# Patient Record
Sex: MALE | Race: WHITE | NOT HISPANIC OR LATINO
[De-identification: names, ages, dates, MRNs, and addresses within clinical notes are randomized per-mention and may not be internally consistent; named-entity substitution may affect disease eponyms.]

---

## 2020-12-18 PROBLEM — Z00.00 ENCOUNTER FOR PREVENTIVE HEALTH EXAMINATION: Status: ACTIVE | Noted: 2020-12-18

## 2020-12-22 ENCOUNTER — APPOINTMENT (OUTPATIENT)
Dept: SURGERY | Facility: CLINIC | Age: 50
End: 2020-12-22
Payer: MEDICAID

## 2020-12-22 VITALS
HEIGHT: 71 IN | BODY MASS INDEX: 23.8 KG/M2 | WEIGHT: 170 LBS | SYSTOLIC BLOOD PRESSURE: 148 MMHG | DIASTOLIC BLOOD PRESSURE: 80 MMHG

## 2020-12-22 DIAGNOSIS — Z87.891 PERSONAL HISTORY OF NICOTINE DEPENDENCE: ICD-10-CM

## 2020-12-22 DIAGNOSIS — Z87.442 PERSONAL HISTORY OF URINARY CALCULI: ICD-10-CM

## 2020-12-22 PROCEDURE — 99072 ADDL SUPL MATRL&STAF TM PHE: CPT

## 2020-12-22 PROCEDURE — 99203 OFFICE O/P NEW LOW 30 MIN: CPT

## 2020-12-27 PROBLEM — Z87.891 FORMER SMOKER: Status: ACTIVE | Noted: 2020-12-27

## 2020-12-27 PROBLEM — Z87.442 HISTORY OF KIDNEY STONES: Status: RESOLVED | Noted: 2020-12-27 | Resolved: 2020-12-27

## 2020-12-27 NOTE — REASON FOR VISIT
[Initial Consultation] : an initial consultation for [FreeTextEntry2] : primary hyperparathyroidism [Spouse] : spouse

## 2020-12-27 NOTE — PHYSICAL EXAM
[de-identified] : no cervical or supraclavicular adenopathy, trachea midline, thyroid without enlargement or palpable mass [Normal] : no neck adenopathy [de-identified] : Skin:  normal appearance.  no rash, nodules, vesicles, or erythema,\par Musculoskeletal:  full range of motion and no deformities appreciated\par Neurological:  grossly intact\par Psychiatric:  oriented to person, place and time with appropriate affect

## 2020-12-27 NOTE — HISTORY OF PRESENT ILLNESS
[de-identified] : Patient referred by Dr. Trujillo for evaluation of primary hyperparathyroidism. Patient was recently noted to have elevated calcium. Reports history of kidney stones, shoulder, and muscle pain, and memory difficulty. Patient denies hypertension, reflux, increased thirst, dysphagia, change in voice or radiation exposure.  Calcium 11.6, creatinine 1.3, , 24-hour urine calcium 91.

## 2020-12-27 NOTE — CONSULT LETTER
[Dear  ___] : Dear  [unfilled], [Consult Letter:] : I had the pleasure of evaluating your patient, [unfilled]. [Please see my note below.] : Please see my note below. [Consult Closing:] : Thank you very much for allowing me to participate in the care of this patient.  If you have any questions, please do not hesitate to contact me. [FreeTextEntry2] : Dr. Chelsie Trujillo [FreeTextEntry3] : Sincerely yours,\par \par Vicki Quinones MD, FACS\par Assistant Professor of Surgery\par [facility]

## 2020-12-27 NOTE — ASSESSMENT
[FreeTextEntry1] : Patient with primary hyperparathyroidism, and history of kidney stones. I recommended a parathyroidectomy with intraoperative PTH monitoring. I have requested a 4D CT scan to assist in preoperative localization.  I have reviewed the pathophysiology of the disease process, the area anatomy and the rationale for surgery.  I have discussed the risks, benefits and alternative treatments which include but are not limited to bleeding, infection, numbness, hoarseness, hypocalcemia, scarring, and need for reoperation. I have answered the patient's questions to their satisfaction. The patient wishes to proceed with recommended procedure.They will contact my office to schedule surgery.

## 2021-01-15 ENCOUNTER — OUTPATIENT (OUTPATIENT)
Dept: OUTPATIENT SERVICES | Facility: HOSPITAL | Age: 51
LOS: 1 days | End: 2021-01-15

## 2021-01-15 VITALS
WEIGHT: 164.02 LBS | HEART RATE: 54 BPM | DIASTOLIC BLOOD PRESSURE: 84 MMHG | SYSTOLIC BLOOD PRESSURE: 122 MMHG | RESPIRATION RATE: 14 BRPM | OXYGEN SATURATION: 98 % | TEMPERATURE: 98 F | HEIGHT: 70.5 IN

## 2021-01-15 DIAGNOSIS — E21.0 PRIMARY HYPERPARATHYROIDISM: ICD-10-CM

## 2021-01-15 DIAGNOSIS — Z98.890 OTHER SPECIFIED POSTPROCEDURAL STATES: Chronic | ICD-10-CM

## 2021-01-15 LAB
ANION GAP SERPL CALC-SCNC: 7 MMOL/L — SIGNIFICANT CHANGE UP (ref 7–14)
BUN SERPL-MCNC: 18 MG/DL — SIGNIFICANT CHANGE UP (ref 7–23)
CALCIUM SERPL-MCNC: 11.9 MG/DL — HIGH (ref 8.4–10.5)
CHLORIDE SERPL-SCNC: 108 MMOL/L — HIGH (ref 98–107)
CO2 SERPL-SCNC: 28 MMOL/L — SIGNIFICANT CHANGE UP (ref 22–31)
CREAT SERPL-MCNC: 1.46 MG/DL — HIGH (ref 0.5–1.3)
GLUCOSE SERPL-MCNC: 114 MG/DL — HIGH (ref 70–99)
HCT VFR BLD CALC: 43.5 % — SIGNIFICANT CHANGE UP (ref 39–50)
HGB BLD-MCNC: 13.5 G/DL — SIGNIFICANT CHANGE UP (ref 13–17)
MCHC RBC-ENTMCNC: 29.5 PG — SIGNIFICANT CHANGE UP (ref 27–34)
MCHC RBC-ENTMCNC: 31 GM/DL — LOW (ref 32–36)
MCV RBC AUTO: 95 FL — SIGNIFICANT CHANGE UP (ref 80–100)
NRBC # BLD: 0 /100 WBCS — SIGNIFICANT CHANGE UP
NRBC # FLD: 0 K/UL — SIGNIFICANT CHANGE UP
PLATELET # BLD AUTO: 195 K/UL — SIGNIFICANT CHANGE UP (ref 150–400)
POTASSIUM SERPL-MCNC: 4.2 MMOL/L — SIGNIFICANT CHANGE UP (ref 3.5–5.3)
POTASSIUM SERPL-SCNC: 4.2 MMOL/L — SIGNIFICANT CHANGE UP (ref 3.5–5.3)
RBC # BLD: 4.58 M/UL — SIGNIFICANT CHANGE UP (ref 4.2–5.8)
RBC # FLD: 12 % — SIGNIFICANT CHANGE UP (ref 10.3–14.5)
SODIUM SERPL-SCNC: 143 MMOL/L — SIGNIFICANT CHANGE UP (ref 135–145)
WBC # BLD: 7.21 K/UL — SIGNIFICANT CHANGE UP (ref 3.8–10.5)
WBC # FLD AUTO: 7.21 K/UL — SIGNIFICANT CHANGE UP (ref 3.8–10.5)

## 2021-01-15 NOTE — H&P PST ADULT - HISTORY OF PRESENT ILLNESS
50 year old agitated male presents as poor historian, refuses to discuss PMH and scheduled procedure and current symptoms, now scheduled for parathyroidectomy with parathyroid hormone assay , denies any medical history, pt very agitated stated he had surgeries in the past then retracts statements saying he doesn't like doctors   Provided pt opportunity to explain and feel comfortable  50 year old agitated male presents as poor historian, refuses to discuss PMH and scheduled procedure or current symptoms, now scheduled for parathyroidectomy with parathyroid hormone assay , denies any medical history, pt very agitated stated he had surgeries in the past then retracts statements saying he doesn't like doctors   Provided pt opportunity to explain feelings and feel comfortable
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
## 2021-01-15 NOTE — H&P PST ADULT - VENOUS THROMBOEMBOLISM CURRENT STATUS
(1) other risk factor (includes escalating BMI, pack-years of smoking, diabetes requiring insulin, chemotherapy, female gender and length of surgery) (0) indicator not present

## 2021-01-15 NOTE — H&P PST ADULT - NSICDXPASTMEDICALHX_GEN_ALL_CORE_FT
PAST MEDICAL HISTORY:  History of memory loss after CVA    Kidney stone "15 years ago"    Primary hyperparathyroidism

## 2021-01-15 NOTE — H&P PST ADULT - NSICDXPASTSURGICALHX_GEN_ALL_CORE_FT
PAST SURGICAL HISTORY:  H/O cranial reconstruction 2018, cranial bone extraction     PAST SURGICAL HISTORY:  H/O craniotomy 2018

## 2021-01-15 NOTE — H&P PST ADULT - NEGATIVE NEUROLOGICAL SYMPTOMS
no weakness/no paresthesias/no generalized seizures/no focal seizures/no loss of sensation/no difficulty walking/no headache/no loss of consciousness/no hemiparesis

## 2021-01-15 NOTE — H&P PST ADULT - ASSESSMENT
Problem: primary hyperparathyroidism  Assessment and Plan: Patient scheduled for surgery on 1/22/2021  Patient provided with verbal and written presurgical instructions; verbalized understanding  with teach back.    Patient provided with famotidine for GI prophylaxis; verbalized understanding.    Patient provided with Chlorhexidine wash, verbal and written instructions reviewed. Patient demonstrated understanding with teach back.   Patient confirmed COVID appointment     Case discussed with Dr. Milner, nor further workup required

## 2021-01-15 NOTE — H&P PST ADULT - NEGATIVE MUSCULOSKELETAL SYMPTOMS
no arthritis/no joint swelling/no myalgia/no muscle cramps/no neck pain/no back pain no arthritis/no joint swelling/no myalgia/no muscle cramps/no muscle weakness/no stiffness/no neck pain/no back pain

## 2021-01-15 NOTE — H&P PST ADULT - MUSCULOSKELETAL
details… No joint pain, swelling or deformity; no limitation of movement ROM intact/no joint swelling/no joint erythema detailed exam

## 2021-01-17 ENCOUNTER — OUTPATIENT (OUTPATIENT)
Dept: OUTPATIENT SERVICES | Facility: HOSPITAL | Age: 51
LOS: 1 days | End: 2021-01-17
Payer: MEDICAID

## 2021-01-17 ENCOUNTER — APPOINTMENT (OUTPATIENT)
Dept: CT IMAGING | Facility: IMAGING CENTER | Age: 51
End: 2021-01-17
Payer: MEDICAID

## 2021-01-17 ENCOUNTER — RESULT REVIEW (OUTPATIENT)
Age: 51
End: 2021-01-17

## 2021-01-17 DIAGNOSIS — E21.0 PRIMARY HYPERPARATHYROIDISM: ICD-10-CM

## 2021-01-17 DIAGNOSIS — Z98.890 OTHER SPECIFIED POSTPROCEDURAL STATES: Chronic | ICD-10-CM

## 2021-01-17 PROBLEM — Z87.898 PERSONAL HISTORY OF OTHER SPECIFIED CONDITIONS: Chronic | Status: ACTIVE | Noted: 2021-01-15

## 2021-01-17 PROBLEM — N20.0 CALCULUS OF KIDNEY: Chronic | Status: ACTIVE | Noted: 2021-01-15

## 2021-01-17 PROCEDURE — 70492 CT SFT TSUE NCK W/O & W/DYE: CPT

## 2021-01-17 PROCEDURE — 70491 CT SOFT TISSUE NECK W/DYE: CPT | Mod: 26

## 2021-01-19 ENCOUNTER — LABORATORY RESULT (OUTPATIENT)
Age: 51
End: 2021-01-19

## 2021-01-19 ENCOUNTER — APPOINTMENT (OUTPATIENT)
Dept: DISASTER EMERGENCY | Facility: CLINIC | Age: 51
End: 2021-01-19

## 2021-01-22 ENCOUNTER — OUTPATIENT (OUTPATIENT)
Dept: OUTPATIENT SERVICES | Facility: HOSPITAL | Age: 51
LOS: 1 days | Discharge: HOME | End: 2021-01-22
Payer: MEDICAID

## 2021-01-22 ENCOUNTER — APPOINTMENT (OUTPATIENT)
Dept: SURGERY | Facility: HOSPITAL | Age: 51
End: 2021-01-22

## 2021-01-22 ENCOUNTER — RESULT REVIEW (OUTPATIENT)
Age: 51
End: 2021-01-22

## 2021-01-22 ENCOUNTER — NON-APPOINTMENT (OUTPATIENT)
Age: 51
End: 2021-01-22

## 2021-01-22 VITALS
DIASTOLIC BLOOD PRESSURE: 83 MMHG | RESPIRATION RATE: 16 BRPM | OXYGEN SATURATION: 97 % | TEMPERATURE: 98 F | HEART RATE: 59 BPM | HEIGHT: 70 IN | WEIGHT: 164.02 LBS | SYSTOLIC BLOOD PRESSURE: 141 MMHG

## 2021-01-22 VITALS
RESPIRATION RATE: 15 BRPM | OXYGEN SATURATION: 100 % | HEART RATE: 68 BPM | DIASTOLIC BLOOD PRESSURE: 89 MMHG | SYSTOLIC BLOOD PRESSURE: 133 MMHG

## 2021-01-22 DIAGNOSIS — E21.0 PRIMARY HYPERPARATHYROIDISM: ICD-10-CM

## 2021-01-22 DIAGNOSIS — Z98.890 OTHER SPECIFIED POSTPROCEDURAL STATES: Chronic | ICD-10-CM

## 2021-01-22 LAB
PTH INTACT, INTRAOP SPECIMEN 2: SIGNIFICANT CHANGE UP
PTH INTACT, INTRAOP SPECIMEN 3: SIGNIFICANT CHANGE UP
PTH INTACT, INTRAOP SPECIMEN 4: SIGNIFICANT CHANGE UP
PTH INTACT, INTRAOP SPECIMEN 5: SIGNIFICANT CHANGE UP
PTH INTACT, INTRAOP TIMING 2: SIGNIFICANT CHANGE UP
PTH INTACT, INTRAOP TIMING 3: SIGNIFICANT CHANGE UP
PTH INTACT, INTRAOP TIMING 4: SIGNIFICANT CHANGE UP
PTH INTACT, INTRAOP TIMING 5: SIGNIFICANT CHANGE UP
PTH INTACT, INTRAOPERATIVE 2: 339 PG/ML — HIGH (ref 15–65)
PTH INTACT, INTRAOPERATIVE 3: 138 PG/ML — HIGH (ref 15–65)
PTH INTACT, INTRAOPERATIVE 4: 86 PG/ML — HIGH (ref 15–65)
PTH INTACT, INTRAOPERATIVE 5: 64 PG/ML — SIGNIFICANT CHANGE UP (ref 15–65)
PTH-INTACT IO % DIF SERPL: 243 PG/ML — HIGH (ref 15–65)

## 2021-01-22 PROCEDURE — 60500 EXPLORE PARATHYROID GLANDS: CPT

## 2021-01-22 PROCEDURE — 88334 PATH CONSLTJ SURG CYTO XM EA: CPT | Mod: 26,59

## 2021-01-22 PROCEDURE — 88331 PATH CONSLTJ SURG 1 BLK 1SPC: CPT | Mod: 26

## 2021-01-22 PROCEDURE — 88305 TISSUE EXAM BY PATHOLOGIST: CPT | Mod: 26

## 2021-01-22 RX ORDER — BENZOCAINE AND MENTHOL 5; 1 G/100ML; G/100ML
1 LIQUID ORAL
Refills: 0 | Status: DISCONTINUED | OUTPATIENT
Start: 2021-01-22 | End: 2021-02-05

## 2021-01-22 RX ORDER — ACETAMINOPHEN 500 MG
2 TABLET ORAL
Qty: 0 | Refills: 0 | DISCHARGE
Start: 2021-01-22

## 2021-01-22 RX ORDER — METOCLOPRAMIDE HCL 10 MG
10 TABLET ORAL ONCE
Refills: 0 | Status: DISCONTINUED | OUTPATIENT
Start: 2021-01-22 | End: 2021-02-05

## 2021-01-22 RX ORDER — ONDANSETRON 8 MG/1
4 TABLET, FILM COATED ORAL ONCE
Refills: 0 | Status: DISCONTINUED | OUTPATIENT
Start: 2021-01-22 | End: 2021-02-05

## 2021-01-22 RX ORDER — BENZOCAINE AND MENTHOL 5; 1 G/100ML; G/100ML
1 LIQUID ORAL
Qty: 0 | Refills: 0 | DISCHARGE
Start: 2021-01-22

## 2021-01-22 RX ORDER — FENTANYL CITRATE 50 UG/ML
25 INJECTION INTRAVENOUS
Refills: 0 | Status: DISCONTINUED | OUTPATIENT
Start: 2021-01-22 | End: 2021-01-22

## 2021-01-22 RX ORDER — ACETAMINOPHEN 500 MG
650 TABLET ORAL EVERY 6 HOURS
Refills: 0 | Status: DISCONTINUED | OUTPATIENT
Start: 2021-01-22 | End: 2021-02-05

## 2021-01-22 RX ADMIN — Medication 2 TABLET(S): at 09:28

## 2021-01-22 NOTE — BRIEF OPERATIVE NOTE - OPERATION/FINDINGS
Removal of enlarged left superior parathyroid gland. Intraoperative PTH decreased appropriately at 3-5 minute half-life time points.

## 2021-01-22 NOTE — ASU DISCHARGE PLAN (ADULT/PEDIATRIC) - ASU DC SPECIAL INSTRUCTIONSFT
Keep dressings dry for 48 hours, then may shower with steri strips.   TAKE OTC CALCIUM (500mg or 600mg) + Vit D as directed, 2 tabs every 8 hours (3 times a day).  Avoid NSAIDs (Advil, Motrin, Ibuprofen) for 48 hours.   Take OTC Tylenol 325mg, 2 tabs every 6 hours as needed for pain.   May take OTC Lozenges (Cepacol) 1 tab every 4 hours for sore throat as needed.  Ice pack to neck for comfort as needed.   *Follow up with Dr. Quinones within 1 week (1/28/2021), please call the office for an appointment.

## 2021-01-22 NOTE — ASU DISCHARGE PLAN (ADULT/PEDIATRIC) - CARE PROVIDER_API CALL
Vicki Quinones (MD)  Surgery  [address], [address]  [place] [address]  Phone: [phone]  Fax: [phone]  Scheduled Appointment: 01/28/2021

## 2021-01-26 LAB — SURGICAL PATHOLOGY STUDY: SIGNIFICANT CHANGE UP

## 2021-01-28 ENCOUNTER — APPOINTMENT (OUTPATIENT)
Dept: SURGERY | Facility: CLINIC | Age: 51
End: 2021-01-28
Payer: MEDICAID

## 2021-01-28 PROCEDURE — 99024 POSTOP FOLLOW-UP VISIT: CPT

## 2021-01-28 PROCEDURE — 36415 COLL VENOUS BLD VENIPUNCTURE: CPT

## 2021-01-28 NOTE — HISTORY OF PRESENT ILLNESS
[de-identified] : Patient referred by Dr. Trujillo for evaluation of primary hyperparathyroidism. Patient was recently noted to have elevated calcium. Reports history of kidney stones, shoulder, and muscle pain, and memory difficulty. Patient denies hypertension, reflux, increased thirst, dysphagia, change in voice or radiation exposure.  Calcium 11.6, creatinine 1.3, , 24-hour urine calcium 91.\par 1/22/21 left superior parathyroidectomy.  denies dysphagia, hoarseness or parathesias.  has been taking calcium 500 TID.

## 2021-01-28 NOTE — ASSESSMENT
[FreeTextEntry1] : Patient with primary hyperparathyroidism, and history of kidney stones. Doing well postop. anca sent, RTO 6 weeks

## 2021-01-28 NOTE — PHYSICAL EXAM
[de-identified] : incision healing without swelling, scar min dsicussed.  no cervical or supraclavicular adenopathy, trachea midline, thyroid without enlargement or palpable mass [Normal] : no neck adenopathy [de-identified] : Skin:  normal appearance.  no rash, nodules, vesicles, or erythema,\par Musculoskeletal:  full range of motion and no deformities appreciated\par Neurological:  grossly intact\par Psychiatric:  oriented to person, place and time with appropriate affect

## 2021-02-02 ENCOUNTER — NON-APPOINTMENT (OUTPATIENT)
Age: 51
End: 2021-02-02

## 2021-02-03 LAB
25(OH)D3 SERPL-MCNC: 12.9 NG/ML
CALCIUM SERPL-MCNC: 9.8 MG/DL
CALCIUM SERPL-MCNC: 9.8 MG/DL
PARATHYROID HORMONE INTACT: 80 PG/ML

## 2021-03-09 ENCOUNTER — APPOINTMENT (OUTPATIENT)
Dept: SURGERY | Facility: CLINIC | Age: 51
End: 2021-03-09
Payer: MEDICAID

## 2021-03-09 PROCEDURE — 99024 POSTOP FOLLOW-UP VISIT: CPT

## 2021-03-09 PROCEDURE — 36415 COLL VENOUS BLD VENIPUNCTURE: CPT

## 2021-03-09 NOTE — HISTORY OF PRESENT ILLNESS
[de-identified] : Patient referred by Dr. Trujillo for evaluation of primary hyperparathyroidism. Patient was recently noted to have elevated calcium. Reports history of kidney stones, shoulder, and muscle pain, and memory difficulty. Patient denies hypertension, reflux, increased thirst, dysphagia, change in voice or radiation exposure.  Calcium 11.6, creatinine 1.3, , 24-hour urine calcium 91.\par 1/22/21 left superior parathyroidectomy.  denies dysphagia, hoarseness or parathesias. last anca PTH 80, vit D 12.    has been taking calcium 500 and vit D 4000.

## 2021-03-09 NOTE — PHYSICAL EXAM
[de-identified] : incision healing well, scar min dsicussed.  no cervical or supraclavicular adenopathy, trachea midline, thyroid without enlargement or palpable mass [Normal] : no neck adenopathy [de-identified] : Skin:  normal appearance.  no rash, nodules, vesicles, or erythema,\par Musculoskeletal:  full range of motion and no deformities appreciated\par Neurological:  grossly intact\par Psychiatric:  oriented to person, place and time with appropriate affect

## 2021-03-09 NOTE — ASSESSMENT
[FreeTextEntry1] : Patient with primary hyperparathyroidism, and history of kidney stones. Doing well postop. anca sent, RTO 4 mo

## 2021-03-13 ENCOUNTER — NON-APPOINTMENT (OUTPATIENT)
Age: 51
End: 2021-03-13

## 2021-03-13 LAB
25(OH)D3 SERPL-MCNC: 28.7 NG/ML
CALCIUM SERPL-MCNC: 10 MG/DL
CALCIUM SERPL-MCNC: 10 MG/DL
PARATHYROID HORMONE INTACT: 27 PG/ML

## 2021-07-08 ENCOUNTER — APPOINTMENT (OUTPATIENT)
Dept: SURGERY | Facility: CLINIC | Age: 51
End: 2021-07-08
Payer: MEDICAID

## 2021-07-08 DIAGNOSIS — E21.0 PRIMARY HYPERPARATHYROIDISM: ICD-10-CM

## 2021-07-08 PROCEDURE — 99213 OFFICE O/P EST LOW 20 MIN: CPT

## 2021-07-08 PROCEDURE — 36415 COLL VENOUS BLD VENIPUNCTURE: CPT

## 2021-07-08 NOTE — PHYSICAL EXAM
[de-identified] : incision healing well, scar min dsicussed.  no cervical or supraclavicular adenopathy, trachea midline, thyroid without enlargement or palpable mass [Normal] : no neck adenopathy [de-identified] : Skin:  normal appearance.  no rash, nodules, vesicles, or erythema,\par Musculoskeletal:  full range of motion and no deformities appreciated\par Neurological:  grossly intact\par Psychiatric:  oriented to person, place and time with appropriate affect

## 2021-07-08 NOTE — HISTORY OF PRESENT ILLNESS
[de-identified] : Patient referred by Dr. Trujillo for evaluation of primary hyperparathyroidism. Patient was recently noted to have elevated calcium. Reports history of kidney stones, shoulder, and muscle pain, and memory difficulty. Patient denies hypertension, reflux, increased thirst, dysphagia, change in voice or radiation exposure.  Calcium 11.6, creatinine 1.3, , 24-hour urine calcium 91.\par 1/22/21 left superior parathyroidectomy.  denies dysphagia, hoarseness or parathesias. last anca PTH 80, vit D 12.    not taking  any supplements.   I have reviewed all old and new data and available images.  Additional information was obtained from others present at the time of the visit to ensure the completeness of the history.\par

## 2021-07-08 NOTE — ASSESSMENT
[FreeTextEntry1] : Patient with primary hyperparathyroidism, and history of kidney stones. Doing well postop. anca sent, if normal will continue under care of PCP and return as needed.

## 2021-07-13 ENCOUNTER — NON-APPOINTMENT (OUTPATIENT)
Age: 51
End: 2021-07-13

## 2021-07-15 LAB
25(OH)D3 SERPL-MCNC: 24.1 NG/ML
CALCIUM SERPL-MCNC: 9.5 MG/DL
CALCIUM SERPL-MCNC: 9.5 MG/DL
PARATHYROID HORMONE INTACT: 56 PG/ML

## 2021-12-30 ENCOUNTER — EMERGENCY (EMERGENCY)
Facility: HOSPITAL | Age: 51
LOS: 1 days | Discharge: HOME | End: 2021-12-30
Attending: EMERGENCY MEDICINE | Admitting: EMERGENCY MEDICINE
Payer: MEDICAID

## 2021-12-30 VITALS — OXYGEN SATURATION: 100 % | TEMPERATURE: 99 F | RESPIRATION RATE: 20 BRPM

## 2021-12-30 VITALS
HEART RATE: 74 BPM | HEIGHT: 70 IN | SYSTOLIC BLOOD PRESSURE: 138 MMHG | OXYGEN SATURATION: 100 % | RESPIRATION RATE: 17 BRPM | DIASTOLIC BLOOD PRESSURE: 86 MMHG | TEMPERATURE: 99 F

## 2021-12-30 DIAGNOSIS — Z98.890 OTHER SPECIFIED POSTPROCEDURAL STATES: Chronic | ICD-10-CM

## 2021-12-30 LAB
ANION GAP SERPL CALC-SCNC: 14 MMOL/L — SIGNIFICANT CHANGE UP (ref 7–14)
BASOPHILS # BLD AUTO: 0 K/UL — SIGNIFICANT CHANGE UP (ref 0–0.2)
BASOPHILS NFR BLD AUTO: 0 % — SIGNIFICANT CHANGE UP (ref 0–2)
BUN SERPL-MCNC: 15 MG/DL — SIGNIFICANT CHANGE UP (ref 7–23)
CALCIUM SERPL-MCNC: 9.1 MG/DL — SIGNIFICANT CHANGE UP (ref 8.4–10.5)
CHLORIDE SERPL-SCNC: 104 MMOL/L — SIGNIFICANT CHANGE UP (ref 98–107)
CO2 SERPL-SCNC: 22 MMOL/L — SIGNIFICANT CHANGE UP (ref 22–31)
CREAT SERPL-MCNC: 1.26 MG/DL — SIGNIFICANT CHANGE UP (ref 0.5–1.3)
EOSINOPHIL # BLD AUTO: 0 K/UL — SIGNIFICANT CHANGE UP (ref 0–0.5)
EOSINOPHIL NFR BLD AUTO: 0 % — SIGNIFICANT CHANGE UP (ref 0–6)
GLUCOSE SERPL-MCNC: 86 MG/DL — SIGNIFICANT CHANGE UP (ref 70–99)
HCT VFR BLD CALC: 38.1 % — LOW (ref 39–50)
HGB BLD-MCNC: 12.4 G/DL — LOW (ref 13–17)
IANC: 2.15 K/UL — SIGNIFICANT CHANGE UP (ref 1.5–8.5)
IMM GRANULOCYTES NFR BLD AUTO: 0.2 % — SIGNIFICANT CHANGE UP (ref 0–1.5)
LYMPHOCYTES # BLD AUTO: 1.95 K/UL — SIGNIFICANT CHANGE UP (ref 1–3.3)
LYMPHOCYTES # BLD AUTO: 40.1 % — SIGNIFICANT CHANGE UP (ref 13–44)
MAGNESIUM SERPL-MCNC: 1.7 MG/DL — SIGNIFICANT CHANGE UP (ref 1.6–2.6)
MCHC RBC-ENTMCNC: 30.3 PG — SIGNIFICANT CHANGE UP (ref 27–34)
MCHC RBC-ENTMCNC: 32.5 GM/DL — SIGNIFICANT CHANGE UP (ref 32–36)
MCV RBC AUTO: 93.2 FL — SIGNIFICANT CHANGE UP (ref 80–100)
MONOCYTES # BLD AUTO: 0.75 K/UL — SIGNIFICANT CHANGE UP (ref 0–0.9)
MONOCYTES NFR BLD AUTO: 15.4 % — HIGH (ref 2–14)
NEUTROPHILS # BLD AUTO: 2.15 K/UL — SIGNIFICANT CHANGE UP (ref 1.8–7.4)
NEUTROPHILS NFR BLD AUTO: 44.3 % — SIGNIFICANT CHANGE UP (ref 43–77)
NRBC # BLD: 0 /100 WBCS — SIGNIFICANT CHANGE UP
NRBC # FLD: 0 K/UL — SIGNIFICANT CHANGE UP
PHOSPHATE SERPL-MCNC: 3.2 MG/DL — SIGNIFICANT CHANGE UP (ref 2.5–4.5)
PLATELET # BLD AUTO: 163 K/UL — SIGNIFICANT CHANGE UP (ref 150–400)
POTASSIUM SERPL-MCNC: 3.4 MMOL/L — LOW (ref 3.5–5.3)
POTASSIUM SERPL-SCNC: 3.4 MMOL/L — LOW (ref 3.5–5.3)
RBC # BLD: 4.09 M/UL — LOW (ref 4.2–5.8)
RBC # FLD: 12.6 % — SIGNIFICANT CHANGE UP (ref 10.3–14.5)
SODIUM SERPL-SCNC: 140 MMOL/L — SIGNIFICANT CHANGE UP (ref 135–145)
TROPONIN T, HIGH SENSITIVITY RESULT: <6 NG/L — SIGNIFICANT CHANGE UP
WBC # BLD: 4.86 K/UL — SIGNIFICANT CHANGE UP (ref 3.8–10.5)
WBC # FLD AUTO: 4.86 K/UL — SIGNIFICANT CHANGE UP (ref 3.8–10.5)

## 2021-12-30 PROCEDURE — 70450 CT HEAD/BRAIN W/O DYE: CPT | Mod: 26,MA,59

## 2021-12-30 PROCEDURE — 99285 EMERGENCY DEPT VISIT HI MDM: CPT

## 2021-12-30 PROCEDURE — 71045 X-RAY EXAM CHEST 1 VIEW: CPT | Mod: 26

## 2021-12-30 NOTE — ED PROVIDER NOTE - OBJECTIVE STATEMENT
51M with a h/o CVA, craniotomy, Nephrolithiasis, presents after an episode of syncope. Patient has had a cough which started yesterday, was on his way into town on a train, got up to exit the train, and lost consciousness after standing up breifly, then stood up and waited for a few minutes until he felt better before continuing on his way. He states he felt slightly nauseas before passing out but denies any dizziness before or after fainting. Covid unvaccinated. Denies headstrike, palpitations, CP, SOB, fever/chills, abd pain, headache, vision changes.

## 2021-12-30 NOTE — ED ADULT NURSE NOTE - OBJECTIVE STATEMENT
Pt received to room 10 A&Ox4 and ambulatory at baseline, unsteady at the present. Pt states he passed out waiting for train accompanied by episode of nausea. Pt states he "wasn't out for long," and continued to walk/take train s/p syncope. Pt with no evidence of injury r/t syncope at the present. Pt endorsing cough and fever for a "few days" at home. Denies CP, SOB, pain at the present. 18G IV placed in LAC and labs drawn as ordered. Remains on CM, NSR. Pt received to room 10 A&Ox4 and ambulatory at baseline, unsteady at the present. PMHx CVA, craniotomy. Pt states he passed out waiting for train accompanied by episode of nausea. Pt states he "wasn't out for long," and continued to walk/take train s/p syncope. Pt with no evidence of injury r/t syncope at the present. Pt endorsing cough and fever for a "few days" at home. Denies CP, SOB, pain at the present. Pt states he is unvaccinated against COVID-19. 18G IV placed in LAC and labs drawn as ordered. Remains on CM, NSR. MD evaluation in progress.

## 2021-12-30 NOTE — ED PROVIDER NOTE - CLINICAL SUMMARY MEDICAL DECISION MAKING FREE TEXT BOX
51M with a h/o CVA, craniotomy, Nephrolithiasis, presents after an episode of syncope. Patient has had a cough which started yesterday, was on his way into town on a train, got up to exit the train, and lost consciousness after standing up breifly, then stood up and waited for a few minutes until he felt better before continuing on his way. He states he felt slightly nauseas before passing out but denies any dizziness before or after fainting. Covid unvaccinated. Denies headstrike, palpitations, CP, SOB, fever/chills, abd pain, headache, vision changes. AVSS. Exam unremarkable. Syncope possibly due to orthostatics, possible covid+ with history of unvaccinated, also hx of previous craniotomy/CVA. Will get labs, imaging including head CT, 51M with a h/o CVA, craniotomy, Nephrolithiasis, presents after an episode of syncope. Patient has had a cough which started yesterday, was on his way into town on a train, got up to exit the train, and lost consciousness after standing up breifly, then stood up and waited for a few minutes until he felt better before continuing on his way. He states he felt slightly nauseas before passing out but denies any dizziness before or after fainting. Covid unvaccinated. Denies headstrike, palpitations, CP, SOB, fever/chills, abd pain, headache, vision changes. AVSS. Exam unremarkable. Syncope possibly due to orthostatics, possible covid+ with history of unvaccinated, also hx of previous craniotomy/CVA. Will get labs, imaging including head CT, orthostatic vitals, and reassess.

## 2021-12-30 NOTE — ED PROVIDER NOTE - NS ED ROS FT
General: denies fever, chills  HENT: denies nasal congestion, rhinorrhea  Eyes: denies visual changes, blurred vision  CV: denies chest pain, palpitations  Resp: denies difficulty breathing, + cough  Abdominal: + nausea, denies vomiting, diarrhea, abdominal pain  : denies urinary pain or discharge  MSK: denies muscle aches, leg swelling  Neuro: +syncope, denies headaches, numbness, tingling  Skin: denies rashes, bruises

## 2021-12-30 NOTE — ED ADULT NURSE NOTE - NSIMPLEMENTINTERV_GEN_ALL_ED
Implemented All Fall Risk Interventions:  [place] to call system. Call bell, personal items and telephone within reach. Instruct patient to call for assistance. Room bathroom lighting operational. Non-slip footwear when patient is off stretcher. Physically safe environment: no spills, clutter or unnecessary equipment. Stretcher in lowest position, wheels locked, appropriate side rails in place. Provide visual cue, wrist band, yellow gown, etc. Monitor gait and stability. Monitor for mental status changes and reorient to person, place, and time. Review medications for side effects contributing to fall risk. Reinforce activity limits and safety measures with patient and family.

## 2021-12-30 NOTE — ED PROVIDER NOTE - NSFOLLOWUPINSTRUCTIONS_ED_ALL_ED_FT
Upper Respiratory Infection, Adult  An upper respiratory infection (URI) is a common viral infection of the nose, throat, and upper air passages that lead to the lungs. The most common type of URI is the common cold. URIs usually get better on their own, without medical treatment.    What are the causes?  A URI is caused by a virus. You may catch a virus by: Breathing in droplets from an infected person's cough or sneeze. Touching something that has been exposed to the virus (contaminated) and then touching your mouth, nose, or eyes.    What increases the risk? You are more likely to get a URI if:  You are very young or very old.It is ermelinda or winter. You have close contact with others, such as at a , school, or health care facility. You smoke. You have long-term (chronic) heart or lung disease. You have a weakened disease-fighting (immune) system. You have nasal allergies or asthma. You are experiencing a lot of stress. You work in an area that has poor air circulation. You have poor nutrition.    What are the signs or symptoms? A URI usually involves some of the following symptoms:  Runny or stuffy (congested) nose. Sneezing. Cough. Sore throat. Headache. Fatigue. Fever. Loss of appetite. Pain in your forehead, behind your eyes, and over your cheekbones (sinus pain). Muscle aches. Redness or irritation of the eyes. Pressure in the ears or face.    How is this diagnosed?  This condition may be diagnosed based on your medical history and symptoms, and a physical exam. Your health care provider may use a cotton swab to take a mucus sample from your nose (nasal swab). This sample can be tested to determine what virus is causing the illness.    How is this treated?  URIs usually get better on their own within 7–10 days. You can take steps at home to relieve your symptoms. Medicines cannot cure URIs, but your health care provider may recommend certain medicines to help relieve symptoms, such as: Over-the-counter cold medicines. Cough suppressants. Coughing is a type of defense against infection that helps to clear the respiratory system, so take these medicines only as recommended by your health care provider. Fever-reducing medicines.    Follow these instructions at home:  Activity: Rest as needed. If you have a fever, stay home from work or school until your fever is gone or until your health care provider says you are no longer contagious. Your health care provider may have you wear a face mask to prevent your infection from spreading.  Relieving symptoms   Gargle with a salt-water mixture 3–4 times a day or as needed. To make a salt-water mixture, completely dissolve ½–1 tsp of salt in 1 cup of warm water. Use a cool-mist humidifier to add moisture to the air. This can help you breathe more easily.  Eating and drinking   Drink enough fluid to keep your urine pale yellow. Eat soups and other clear broths.    General instructions:  Take over-the-counter and prescription medicines only as told by your health care provider. These include cold medicines, fever reducers, and cough suppressants. Do not use any products that contain nicotine or tobacco, such as cigarettes and e-cigarettes. If you need help quitting, ask your health care provider. Stay away from secondhand smoke. Stay up to date on all immunizations, including the yearly (annual) flu vaccine. Keep all follow-up visits as told by your health care provider. This is important.  How to prevent the spread of infection to others:  URIs can be passed from person to person (are contagious). To prevent the infection from spreading:  Wash your hands often with soap and water. If soap and water are not available, use hand . Avoid touching your mouth, face, eyes, or nose. Cough or sneeze into a tissue or your sleeve or elbow instead of into your hand or into the air.    Contact a health care provider if:  You are getting worse instead of better. You have a fever or chills. Your mucus is brown or red. You have yellow or brown discharge coming from your nose. You have pain in your face, especially when you bend forward. You have swollen neck glands. You have pain while swallowing. You have white areas in the back of your throat.  Get help right away if:  You have shortness of breath that gets worse. You have severe or persistent:  Headache. Ear pain. Sinus pain. Chest pain. You have chronic lung disease along with any of the following:  Wheezing. Prolonged cough. Coughing up blood. A change in your usual mucus. You have a stiff neck. You have changes in your: Vision. Hearing. Thinking. Mood.     Summary  An upper respiratory infection (URI) is a common infection of the nose, throat, and upper air passages that lead to the lungs. A URI is caused by a virus. URIs usually get better on their own within 7–10 days. Medicines cannot cure URIs, but your health care provider may recommend certain medicines to help relieve symptoms. This information is not intended to replace advice given to you by your health care provider. Make sure you discuss any questions you have with your health care provider.

## 2021-12-30 NOTE — ED ADULT TRIAGE NOTE - CHIEF COMPLAINT QUOTE
pt states he was walking for a train when he felt nauseas and then passed out. states he was able to stand up after and take the train home. c/o cough and fevers at home. denies pain, head trauma, chest pain, sob and blood thinners.

## 2021-12-30 NOTE — ED PROVIDER NOTE - PATIENT PORTAL LINK FT
You can access the FollowMyHealth Patient Portal offered by [facility] by registering at the following website: [email or web address]. By joining [email or web address] FollowMyHealth portal, you will also be able to view your health information using other applications (apps) compatible with our system.
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
## 2021-12-31 LAB

## 2022-10-26 NOTE — H&P PST ADULT - ANESTHESIA, PREVIOUS REACTION, PROFILE
Brief Care Management Note    Length of Stay (days): 1    Expected Discharge Date: 10/26/2022     Concerns to be Addressed: Discharge planning.   Patient plan of care discussed at interdisciplinary rounds: Yes    Anticipated Discharge Disposition:  [facility]  Anticipated Discharge Services:  NA  Anticipated Discharge DME:  NA    Patient/family educated on Medicare website which has current facility and service quality ratings:  NA  Education Provided on the Discharge Plan: Yes   Patient/Family in Agreement with the Plan: Yes    Referrals Placed by CM/SW:  No new referrals at this time.   Private pay costs discussed: Not applicable    Additional Information:  Patient is medically ready for discharge. Patient lives at [facility] in [place]. Writer contacted the nurse line, confirmed that they are able to take patient back today, discharge orders faxed at this time. Patient notes that her son will be providing transportation. MICHELLE completed w/patient. No additional discharge needs noted.     [facility]  RN Line: [phone]  Fax: [phone]    Migdalia Yañez, RNCC, BSN    [facility]    [address]  [address] [place] [id number]    [email or web address]    Office: [phone] Pager: [phone]    To contact the weekend RNCC  [place] (0800 - 1630) Saturday and Sunday    Units: 4A, 4C, 4E, 5A and 5B- Pager 1: [phone]    Units: 6A, 6B, 6C, 6D- Pager 2: [phone]    Units: 7A, 7B, 7C, 7D, and 5C-Pager 3: [phone]    
Denies family history of malignant hyperthermia/none

## 2024-12-09 ENCOUNTER — APPOINTMENT (OUTPATIENT)
Dept: UROLOGY | Facility: CLINIC | Age: 54
End: 2024-12-09

## 2024-12-11 ENCOUNTER — APPOINTMENT (OUTPATIENT)
Dept: UROLOGY | Facility: CLINIC | Age: 54
End: 2024-12-11
Payer: MEDICAID

## 2024-12-11 VITALS
WEIGHT: 165 LBS | BODY MASS INDEX: 23.1 KG/M2 | HEART RATE: 64 BPM | DIASTOLIC BLOOD PRESSURE: 92 MMHG | HEIGHT: 71 IN | OXYGEN SATURATION: 98 % | SYSTOLIC BLOOD PRESSURE: 155 MMHG | TEMPERATURE: 97.6 F

## 2024-12-11 DIAGNOSIS — Z87.891 PERSONAL HISTORY OF NICOTINE DEPENDENCE: ICD-10-CM

## 2024-12-11 DIAGNOSIS — N13.30 UNSPECIFIED HYDRONEPHROSIS: ICD-10-CM

## 2024-12-11 DIAGNOSIS — E21.0 PRIMARY HYPERPARATHYROIDISM: ICD-10-CM

## 2024-12-11 DIAGNOSIS — N20.0 CALCULUS OF KIDNEY: ICD-10-CM

## 2024-12-11 PROCEDURE — 99203 OFFICE O/P NEW LOW 30 MIN: CPT

## 2025-01-09 ENCOUNTER — APPOINTMENT (OUTPATIENT)
Dept: UROLOGY | Facility: CLINIC | Age: 55
End: 2025-01-09
Payer: MEDICAID

## 2025-01-09 DIAGNOSIS — N20.0 CALCULUS OF KIDNEY: ICD-10-CM

## 2025-01-09 DIAGNOSIS — N13.30 UNSPECIFIED HYDRONEPHROSIS: ICD-10-CM

## 2025-01-09 PROCEDURE — 99214 OFFICE O/P EST MOD 30 MIN: CPT

## 2025-01-30 ENCOUNTER — OUTPATIENT (OUTPATIENT)
Dept: OUTPATIENT SERVICES | Facility: HOSPITAL | Age: 55
LOS: 1 days | End: 2025-01-30
Payer: MEDICAID

## 2025-01-30 VITALS
RESPIRATION RATE: 18 BRPM | SYSTOLIC BLOOD PRESSURE: 148 MMHG | WEIGHT: 164.91 LBS | DIASTOLIC BLOOD PRESSURE: 90 MMHG | OXYGEN SATURATION: 100 % | HEIGHT: 69 IN | TEMPERATURE: 98 F | HEART RATE: 63 BPM

## 2025-01-30 DIAGNOSIS — N20.0 CALCULUS OF KIDNEY: ICD-10-CM

## 2025-01-30 DIAGNOSIS — Z98.890 OTHER SPECIFIED POSTPROCEDURAL STATES: Chronic | ICD-10-CM

## 2025-01-30 DIAGNOSIS — Z01.818 ENCOUNTER FOR OTHER PREPROCEDURAL EXAMINATION: ICD-10-CM

## 2025-01-30 DIAGNOSIS — N13.30 UNSPECIFIED HYDRONEPHROSIS: ICD-10-CM

## 2025-01-30 PROBLEM — Z87.898 PERSONAL HISTORY OF OTHER SPECIFIED CONDITIONS: Chronic | Status: INACTIVE | Noted: 2021-01-15 | Resolved: 2025-01-30

## 2025-01-30 LAB
ANION GAP SERPL CALC-SCNC: 13 MMOL/L — SIGNIFICANT CHANGE UP (ref 5–17)
BUN SERPL-MCNC: 19 MG/DL — SIGNIFICANT CHANGE UP (ref 7–23)
CALCIUM SERPL-MCNC: 9.5 MG/DL — SIGNIFICANT CHANGE UP (ref 8.4–10.5)
CHLORIDE SERPL-SCNC: 107 MMOL/L — SIGNIFICANT CHANGE UP (ref 96–108)
CO2 SERPL-SCNC: 24 MMOL/L — SIGNIFICANT CHANGE UP (ref 22–31)
CREAT SERPL-MCNC: 1.13 MG/DL — SIGNIFICANT CHANGE UP (ref 0.5–1.3)
EGFR: 77 ML/MIN/1.73M2 — SIGNIFICANT CHANGE UP
GLUCOSE SERPL-MCNC: 140 MG/DL — HIGH (ref 70–99)
HCT VFR BLD CALC: 41.8 % — SIGNIFICANT CHANGE UP (ref 39–50)
HCV AB S/CO SERPL IA: 0.05 S/CO — SIGNIFICANT CHANGE UP
HCV AB SERPL-IMP: SIGNIFICANT CHANGE UP
HGB BLD-MCNC: 13.5 G/DL — SIGNIFICANT CHANGE UP (ref 13–17)
MCHC RBC-ENTMCNC: 30.3 PG — SIGNIFICANT CHANGE UP (ref 27–34)
MCHC RBC-ENTMCNC: 32.3 G/DL — SIGNIFICANT CHANGE UP (ref 32–36)
MCV RBC AUTO: 93.7 FL — SIGNIFICANT CHANGE UP (ref 80–100)
NRBC # BLD: 0 /100 WBCS — SIGNIFICANT CHANGE UP (ref 0–0)
NRBC BLD-RTO: 0 /100 WBCS — SIGNIFICANT CHANGE UP (ref 0–0)
PLATELET # BLD AUTO: 202 K/UL — SIGNIFICANT CHANGE UP (ref 150–400)
POTASSIUM SERPL-MCNC: 3.8 MMOL/L — SIGNIFICANT CHANGE UP (ref 3.5–5.3)
POTASSIUM SERPL-SCNC: 3.8 MMOL/L — SIGNIFICANT CHANGE UP (ref 3.5–5.3)
RBC # BLD: 4.46 M/UL — SIGNIFICANT CHANGE UP (ref 4.2–5.8)
RBC # FLD: 12.8 % — SIGNIFICANT CHANGE UP (ref 10.3–14.5)
SODIUM SERPL-SCNC: 144 MMOL/L — SIGNIFICANT CHANGE UP (ref 135–145)
WBC # BLD: 7.79 K/UL — SIGNIFICANT CHANGE UP (ref 3.8–10.5)
WBC # FLD AUTO: 7.79 K/UL — SIGNIFICANT CHANGE UP (ref 3.8–10.5)

## 2025-01-30 PROCEDURE — 36415 COLL VENOUS BLD VENIPUNCTURE: CPT

## 2025-01-30 PROCEDURE — 87086 URINE CULTURE/COLONY COUNT: CPT

## 2025-01-30 PROCEDURE — 86803 HEPATITIS C AB TEST: CPT

## 2025-01-30 PROCEDURE — 85027 COMPLETE CBC AUTOMATED: CPT

## 2025-01-30 PROCEDURE — 80048 BASIC METABOLIC PNL TOTAL CA: CPT

## 2025-01-30 PROCEDURE — G0463: CPT

## 2025-01-30 NOTE — H&P PST ADULT - PROBLEM SELECTOR PLAN 1
Scheduled for 1st stage, cystoscopy, bilateral retrograde, left percutaneous stone removal, right stent placement with Dr. David Hoenig on 2/10/25.  Preop instructions provided.  Questions answered.
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
## 2025-01-30 NOTE — H&P PST ADULT - ASSESSMENT
DASI score: 8   DASI activity: able to perform ADL, stairs and ambulate without SOB or MENDOZA.   Loose teeth or denture: upper and lower dentures       CAPRINI SCORE    AGE RELATED RISK FACTORS                                                             [x ] Age 41-60 years                                            (1 Point)  [ ] Age: 61-74 years                                           (2 Points)                 [ ] Age= 75 years                                                (3 Points)             DISEASE RELATED RISK FACTORS                                                       [ ] Edema in the lower extremities                 (1 Point)                     [ ] Varicose veins                                               (1 Point)                                 [ ] BMI > 25 Kg/m2                                            (1 Point)                                  [ ] Serious infection (ie PNA)                            (1 Point)                     [ ] Lung disease ( COPD, Emphysema)            (1 Point)                                                                          [ ] Acute myocardial infarction                         (1 Point)                  [ ] Congestive heart failure (in the previous month)  (1 Point)         [ ] Inflammatory bowel disease                            (1 Point)                  [ ] Central venous access, PICC or Port               (2 points)       (within the last month)                                                                [ ] Stroke (in the previous month)                        (5 Points)    [ ] Previous or present malignancy                       (2 points)                                                                                                                                                         HEMATOLOGY RELATED FACTORS                                                         [ ] Prior episodes of VTE                                     (3 Points)                     [ ] Positive family history for VTE                      (3 Points)                  [ ] Prothrombin 10537 A                                     (3 Points)                     [ ] Factor V Leiden                                                (3 Points)                        [ ] Lupus anticoagulants                                      (3 Points)                                                           [ ] Anticardiolipin antibodies                              (3 Points)                                                       [ ] High homocysteine in the blood                   (3 Points)                                             [ ] Other congenital or acquired thrombophilia      (3 Points)                                                [ ] Heparin induced thrombocytopenia                  (3 Points)                                        MOBILITY RELATED FACTORS  [ ] Bed rest                                                         (1 Point)  [ ] Plaster cast                                                    (2 points)  [ ] Bed bound for more than 72 hours           (2 Points)    GENDER SPECIFIC FACTORS  [ ] Pregnancy or had a baby within the last month   (1 Point)  [ ] Post-partum < 6 weeks                                   (1 Point)  [ ] Hormonal therapy  or oral contraception   (1 Point)  [ ] History of pregnancy complications              (1 point)  [ ] Unexplained or recurrent              (1 Point)    OTHER RISK FACTORS                                           (1 Point)  [ x] BMI >40, smoking, diabetes requiring insulin, chemotherapy  blood transfusions and length of surgery over 2 hours    SURGERY RELATED RISK FACTORS  [ ]  Section within the last month     (1 Point)  [ ] Minor surgery                                                  (1 Point)  [ ] Arthroscopic surgery                                       (2 Points)  [ ] Planned major surgery lasting more            (2 Points)      than 45 minutes     [ ] Elective hip or knee joint replacement       (5 points)       surgery                                                TRAUMA RELATED RISK FACTORS  [ ] Fracture of the hip, pelvis, or leg                       (5 Points)  [ ] Spinal cord injury resulting in paralysis             (5 points)       (in the previous month)    [ ] Paralysis  (less than 1 month)                             (5 Points)  [ ] Multiple Trauma within 1 month                        (5 Points)    Total Score [  3      ]    Caprini Score 0-2: Low Risk, NO VTE prophylaxis required for most patients, encourage ambulation  Caprini Score 3-6: Moderate Risk , pharmacologic VTE prophylaxis is indicated for most patients (in the absence of contraindications)  Caprini Score Greater than or =7: High risk, pharmocologic VTE prophylaxis indicated for most patients (in the absence of contraindications)
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
## 2025-01-30 NOTE — H&P PST ADULT - ATTENDING COMMENTS
bilateral staghorn configuration stones  pt seen and examined  films reviewed  for left PCNL, right stent placement to unobstruct  may require second stage on left in coming days  consent signed, both sides marked

## 2025-01-31 LAB
CULTURE RESULTS: SIGNIFICANT CHANGE UP
SPECIMEN SOURCE: SIGNIFICANT CHANGE UP

## 2025-02-10 ENCOUNTER — RESULT REVIEW (OUTPATIENT)
Age: 55
End: 2025-02-10

## 2025-02-10 ENCOUNTER — INPATIENT (INPATIENT)
Facility: HOSPITAL | Age: 55
LOS: 3 days | Discharge: HOME | DRG: 661 | End: 2025-02-14
Attending: UROLOGY | Admitting: UROLOGY
Payer: MEDICAID

## 2025-02-10 ENCOUNTER — TRANSCRIPTION ENCOUNTER (OUTPATIENT)
Age: 55
End: 2025-02-10

## 2025-02-10 ENCOUNTER — APPOINTMENT (OUTPATIENT)
Dept: UROLOGY | Facility: HOSPITAL | Age: 55
End: 2025-02-10

## 2025-02-10 VITALS
TEMPERATURE: 98 F | HEIGHT: 69.02 IN | WEIGHT: 164.91 LBS | RESPIRATION RATE: 15 BRPM | HEART RATE: 59 BPM | SYSTOLIC BLOOD PRESSURE: 147 MMHG | OXYGEN SATURATION: 98 % | DIASTOLIC BLOOD PRESSURE: 88 MMHG

## 2025-02-10 DIAGNOSIS — N13.30 UNSPECIFIED HYDRONEPHROSIS: ICD-10-CM

## 2025-02-10 DIAGNOSIS — Z98.890 OTHER SPECIFIED POSTPROCEDURAL STATES: Chronic | ICD-10-CM

## 2025-02-10 DIAGNOSIS — N20.0 CALCULUS OF KIDNEY: ICD-10-CM

## 2025-02-10 DIAGNOSIS — Z01.818 ENCOUNTER FOR OTHER PREPROCEDURAL EXAMINATION: ICD-10-CM

## 2025-02-10 LAB
ANION GAP SERPL CALC-SCNC: 10 MMOL/L — SIGNIFICANT CHANGE UP (ref 5–17)
BUN SERPL-MCNC: 14 MG/DL — SIGNIFICANT CHANGE UP (ref 7–23)
CALCIUM SERPL-MCNC: 8.1 MG/DL — LOW (ref 8.4–10.5)
CHLORIDE SERPL-SCNC: 111 MMOL/L — HIGH (ref 96–108)
CO2 SERPL-SCNC: 22 MMOL/L — SIGNIFICANT CHANGE UP (ref 22–31)
CREAT SERPL-MCNC: 1.22 MG/DL — SIGNIFICANT CHANGE UP (ref 0.5–1.3)
EGFR: 70 ML/MIN/1.73M2 — SIGNIFICANT CHANGE UP
GLUCOSE SERPL-MCNC: 133 MG/DL — HIGH (ref 70–99)
HCT VFR BLD CALC: 34.3 % — LOW (ref 39–50)
HGB BLD-MCNC: 11.3 G/DL — LOW (ref 13–17)
MCHC RBC-ENTMCNC: 31 PG — SIGNIFICANT CHANGE UP (ref 27–34)
MCHC RBC-ENTMCNC: 32.9 G/DL — SIGNIFICANT CHANGE UP (ref 32–36)
MCV RBC AUTO: 94 FL — SIGNIFICANT CHANGE UP (ref 80–100)
NRBC # BLD: 0 /100 WBCS — SIGNIFICANT CHANGE UP (ref 0–0)
NRBC BLD-RTO: 0 /100 WBCS — SIGNIFICANT CHANGE UP (ref 0–0)
PLATELET # BLD AUTO: 167 K/UL — SIGNIFICANT CHANGE UP (ref 150–400)
POTASSIUM SERPL-MCNC: 4.4 MMOL/L — SIGNIFICANT CHANGE UP (ref 3.5–5.3)
POTASSIUM SERPL-SCNC: 4.4 MMOL/L — SIGNIFICANT CHANGE UP (ref 3.5–5.3)
RBC # BLD: 3.65 M/UL — LOW (ref 4.2–5.8)
RBC # FLD: 12.9 % — SIGNIFICANT CHANGE UP (ref 10.3–14.5)
SODIUM SERPL-SCNC: 143 MMOL/L — SIGNIFICANT CHANGE UP (ref 135–145)
WBC # BLD: 11.34 K/UL — HIGH (ref 3.8–10.5)
WBC # FLD AUTO: 11.34 K/UL — HIGH (ref 3.8–10.5)

## 2025-02-10 PROCEDURE — 50081 PERQ NL/PL LITHOTRP CPLX>2CM: CPT | Mod: LT

## 2025-02-10 PROCEDURE — 88300 SURGICAL PATH GROSS: CPT | Mod: 26

## 2025-02-10 PROCEDURE — 71045 X-RAY EXAM CHEST 1 VIEW: CPT | Mod: 26

## 2025-02-10 PROCEDURE — 52332 CYSTOSCOPY AND TREATMENT: CPT | Mod: RT,59

## 2025-02-10 PROCEDURE — 50433 PLMT NEPHROURETERAL CATHETER: CPT | Mod: LT,59

## 2025-02-10 PROCEDURE — 50437 DILAT XST TRC NEW ACCESS RCS: CPT | Mod: LT,59

## 2025-02-10 DEVICE — URETERAL CATH IMAGER II BERN 5FR 65CM: Type: IMPLANTABLE DEVICE | Site: BILATERAL | Status: FUNCTIONAL

## 2025-02-10 DEVICE — STENT URET 7FR 28CM: Type: IMPLANTABLE DEVICE | Site: BILATERAL | Status: FUNCTIONAL

## 2025-02-10 DEVICE — SURGIFLO MATRIX WITH THROMBIN KIT: Type: IMPLANTABLE DEVICE | Site: BILATERAL | Status: FUNCTIONAL

## 2025-02-10 DEVICE — TRILOGY PROBE KIT 3.9MM X 440MM (BLUE): Type: IMPLANTABLE DEVICE | Site: BILATERAL | Status: FUNCTIONAL

## 2025-02-10 DEVICE — URETERAL CATH AXXCESS OPEN END 6FR 70CM: Type: IMPLANTABLE DEVICE | Site: BILATERAL | Status: FUNCTIONAL

## 2025-02-10 DEVICE — NEPHROSTOMY BALLOON CATH X-FORCE 7FR X 15CM 10MM: Type: IMPLANTABLE DEVICE | Site: BILATERAL | Status: FUNCTIONAL

## 2025-02-10 DEVICE — GUIDEWIRE SENSOR DUAL-FLEX NITINOL STRAIGHT .035" X 150CM: Type: IMPLANTABLE DEVICE | Site: BILATERAL | Status: FUNCTIONAL

## 2025-02-10 DEVICE — GWIRE FLEX TIP 0.035INX150CM 3MM: Type: IMPLANTABLE DEVICE | Site: BILATERAL | Status: FUNCTIONAL

## 2025-02-10 RX ORDER — OXYCODONE HYDROCHLORIDE 30 MG/1
5 TABLET ORAL ONCE
Refills: 0 | Status: DISCONTINUED | OUTPATIENT
Start: 2025-02-10 | End: 2025-02-10

## 2025-02-10 RX ORDER — ANTISEPTIC SURGICAL SCRUB 0.04 MG/ML
1 SOLUTION TOPICAL ONCE
Refills: 0 | Status: DISCONTINUED | OUTPATIENT
Start: 2025-02-10 | End: 2025-02-10

## 2025-02-10 RX ORDER — CEFTRIAXONE 250 MG/1
1000 INJECTION, POWDER, FOR SOLUTION INTRAMUSCULAR; INTRAVENOUS EVERY 24 HOURS
Refills: 0 | Status: DISCONTINUED | OUTPATIENT
Start: 2025-02-10 | End: 2025-02-13

## 2025-02-10 RX ORDER — OXYCODONE HYDROCHLORIDE 30 MG/1
5 TABLET ORAL EVERY 8 HOURS
Refills: 0 | Status: DISCONTINUED | OUTPATIENT
Start: 2025-02-10 | End: 2025-02-13

## 2025-02-10 RX ORDER — CEFAZOLIN SODIUM IN 0.9 % NACL 2 G/10 ML
2000 SYRINGE (ML) INTRAVENOUS ONCE
Refills: 0 | Status: COMPLETED | OUTPATIENT
Start: 2025-02-10 | End: 2025-02-10

## 2025-02-10 RX ORDER — SODIUM CHLORIDE 9 G/ML
1000 INJECTION, SOLUTION INTRAVENOUS
Refills: 0 | Status: DISCONTINUED | OUTPATIENT
Start: 2025-02-10 | End: 2025-02-12

## 2025-02-10 RX ORDER — FENTANYL CITRATE 50 UG/ML
50 INJECTION INTRAMUSCULAR; INTRAVENOUS
Refills: 0 | Status: DISCONTINUED | OUTPATIENT
Start: 2025-02-10 | End: 2025-02-10

## 2025-02-10 RX ORDER — ONDANSETRON 4 MG/1
4 TABLET, ORALLY DISINTEGRATING ORAL ONCE
Refills: 0 | Status: DISCONTINUED | OUTPATIENT
Start: 2025-02-10 | End: 2025-02-10

## 2025-02-10 RX ORDER — LIDOCAINE HYDROCHLORIDE 10 MG/ML
0.2 INJECTION EPIDURAL; INFILTRATION; INTRACAUDAL ONCE
Refills: 0 | Status: DISCONTINUED | OUTPATIENT
Start: 2025-02-10 | End: 2025-02-10

## 2025-02-10 RX ORDER — BACTERIOSTATIC SODIUM CHLORIDE 0.9 %
3 VIAL (ML) INJECTION EVERY 8 HOURS
Refills: 0 | Status: DISCONTINUED | OUTPATIENT
Start: 2025-02-10 | End: 2025-02-10

## 2025-02-10 RX ORDER — ACETAMINOPHEN 160 MG/5ML
975 SUSPENSION ORAL EVERY 6 HOURS
Refills: 0 | Status: DISCONTINUED | OUTPATIENT
Start: 2025-02-10 | End: 2025-02-13

## 2025-02-10 RX ORDER — HEPARIN SODIUM,PORCINE 10000/ML
5000 VIAL (ML) INJECTION EVERY 12 HOURS
Refills: 0 | Status: DISCONTINUED | OUTPATIENT
Start: 2025-02-10 | End: 2025-02-13

## 2025-02-10 RX ADMIN — ACETAMINOPHEN 975 MILLIGRAM(S): 160 SUSPENSION ORAL at 18:27

## 2025-02-10 RX ADMIN — CEFTRIAXONE 1000 MILLIGRAM(S): 250 INJECTION, POWDER, FOR SOLUTION INTRAMUSCULAR; INTRAVENOUS at 20:48

## 2025-02-10 RX ADMIN — SODIUM CHLORIDE 125 MILLILITER(S): 9 INJECTION, SOLUTION INTRAVENOUS at 12:43

## 2025-02-10 RX ADMIN — Medication 5000 UNIT(S): at 18:03

## 2025-02-10 RX ADMIN — Medication 3 MILLILITER(S): at 09:40

## 2025-02-10 RX ADMIN — ACETAMINOPHEN 975 MILLIGRAM(S): 160 SUSPENSION ORAL at 18:03

## 2025-02-10 NOTE — PROGRESS NOTE ADULT - SUBJECTIVE AND OBJECTIVE BOX
Post op Check    Pt seen and examined without complaints. Pain is controlled. Denies SOB/CP/N/V.     Vital Signs Last 24 Hrs  T(C): 36 (10 Feb 2025 12:35), Max: 36.4 (10 Feb 2025 08:13)  T(F): 96.8 (10 Feb 2025 12:35), Max: 97.5 (10 Feb 2025 08:13)  HR: 56 (10 Feb 2025 15:00) (50 - 62)  BP: 155/98 (10 Feb 2025 15:00) (143/94 - 166/88)  BP(mean): 123 (10 Feb 2025 15:00) (112 - 123)  RR: 16 (10 Feb 2025 15:00) (15 - 18)  SpO2: 99% (10 Feb 2025 15:00) (97% - 100%)    Parameters below as of 10 Feb 2025 15:00  Patient On (Oxygen Delivery Method): room air        I&O's Summary    10 Feb 2025 07:01  -  10 Feb 2025 15:20  --------------------------------------------------------  IN: 500 mL / OUT: 560 mL / NET: -60 mL        Physical Exam  Gen: NAD  Pulm: No respiratory distress, no subcostal retractions  Abd: Soft, NT, ND  Back: Saturated NT dressing changed.  : Nephrostomy tube draining punch colored liquid.  Mueller catheter draining light pink urine                          11.3   11.34 )-----------( 167      ( 10 Feb 2025 12:51 )             34.3       02-10    143  |  111[H]  |  14  ----------------------------<  133[H]  4.4   |  22  |  1.22    Ca    8.1[L]      10 Feb 2025 12:51        A/P: 54y Male s/p   DVT prophylaxis/OOB  Incentive spirometry  Strict I&O's  Analgesia and antiemetics as needed  Diet  AM labs    Post op Check    Pt seen and examined without complaints. Pain is controlled. Denies SOB/CP/N/V.     Vital Signs Last 24 Hrs  T(C): 36 (10 Feb 2025 12:35), Max: 36.4 (10 Feb 2025 08:13)  T(F): 96.8 (10 Feb 2025 12:35), Max: 97.5 (10 Feb 2025 08:13)  HR: 56 (10 Feb 2025 15:00) (50 - 62)  BP: 155/98 (10 Feb 2025 15:00) (143/94 - 166/88)  BP(mean): 123 (10 Feb 2025 15:00) (112 - 123)  RR: 16 (10 Feb 2025 15:00) (15 - 18)  SpO2: 99% (10 Feb 2025 15:00) (97% - 100%)    Parameters below as of 10 Feb 2025 15:00  Patient On (Oxygen Delivery Method): room air        I&O's Summary    10 Feb 2025 07:01  -  10 Feb 2025 15:20  --------------------------------------------------------  IN: 500 mL / OUT: 560 mL / NET: -60 mL        Physical Exam  Gen: NAD  Pulm: No respiratory distress, no subcostal retractions  Abd: Soft, NT, ND  Back: Saturated NT dressing changed.  : Nephrostomy tube draining punch colored liquid.  Mueller catheter draining light pink urine                          11.3   11.34 )-----------( 167      ( 10 Feb 2025 12:51 )             34.3       02-10    143  |  111[H]  |  14  ----------------------------<  133[H]  4.4   |  22  |  1.22    Ca    8.1[L]      10 Feb 2025 12:51

## 2025-02-10 NOTE — PROGRESS NOTE ADULT - ASSESSMENT
54 year old male s/p L PCNL R stent      -PACU labs to be reviewed  -CT in AM  -Trend H/H  -Mueller to drainage  -NT to drainage  -2nd stage PCNL scheduled for Thursday 54 year old male s/p L PCNL R stent      -PACU labs to be reviewed  -CT in AM  -Trend H/H  -Mueller to drainage  -NT to drainage  -DVT Prophylaxis/OOB  -Analgesia and antiemetics as needed  -Regular diet   -2nd stage PCNL planned for Thursday 2/13/25   54 year old male s/p L PCNL R stent      -PACU labs reviewed, will continue to trend H/H  -CT in AM to evaluate residual stone burden   -Mueller to drainage  -NT to drainage  -DVT Prophylaxis/OOB  -Analgesia and antiemetics as needed  -Regular diet   -2nd stage PCNL planned for Thursday 2/13/25

## 2025-02-11 LAB
ANION GAP SERPL CALC-SCNC: 10 MMOL/L — SIGNIFICANT CHANGE UP (ref 5–17)
BUN SERPL-MCNC: <4 MG/DL — LOW (ref 7–23)
CALCIUM SERPL-MCNC: 8.8 MG/DL — SIGNIFICANT CHANGE UP (ref 8.4–10.5)
CHLORIDE SERPL-SCNC: 112 MMOL/L — HIGH (ref 96–108)
CO2 SERPL-SCNC: 22 MMOL/L — SIGNIFICANT CHANGE UP (ref 22–31)
CREAT SERPL-MCNC: 1.48 MG/DL — HIGH (ref 0.5–1.3)
EGFR: 56 ML/MIN/1.73M2 — LOW
GLUCOSE SERPL-MCNC: 89 MG/DL — SIGNIFICANT CHANGE UP (ref 70–99)
HCT VFR BLD CALC: 33.2 % — LOW (ref 39–50)
HGB BLD-MCNC: 10.8 G/DL — LOW (ref 13–17)
MCHC RBC-ENTMCNC: 30.9 PG — SIGNIFICANT CHANGE UP (ref 27–34)
MCHC RBC-ENTMCNC: 32.5 G/DL — SIGNIFICANT CHANGE UP (ref 32–36)
MCV RBC AUTO: 94.9 FL — SIGNIFICANT CHANGE UP (ref 80–100)
NRBC # BLD: 0 /100 WBCS — SIGNIFICANT CHANGE UP (ref 0–0)
NRBC BLD-RTO: 0 /100 WBCS — SIGNIFICANT CHANGE UP (ref 0–0)
PLATELET # BLD AUTO: 166 K/UL — SIGNIFICANT CHANGE UP (ref 150–400)
POTASSIUM SERPL-MCNC: 4.1 MMOL/L — SIGNIFICANT CHANGE UP (ref 3.5–5.3)
POTASSIUM SERPL-SCNC: 4.1 MMOL/L — SIGNIFICANT CHANGE UP (ref 3.5–5.3)
RBC # BLD: 3.5 M/UL — LOW (ref 4.2–5.8)
RBC # FLD: 13 % — SIGNIFICANT CHANGE UP (ref 10.3–14.5)
SODIUM SERPL-SCNC: 144 MMOL/L — SIGNIFICANT CHANGE UP (ref 135–145)
SURGICAL PATHOLOGY STUDY: SIGNIFICANT CHANGE UP
WBC # BLD: 17.1 K/UL — HIGH (ref 3.8–10.5)
WBC # FLD AUTO: 17.1 K/UL — HIGH (ref 3.8–10.5)

## 2025-02-11 PROCEDURE — 74176 CT ABD & PELVIS W/O CONTRAST: CPT | Mod: 26

## 2025-02-11 RX ADMIN — ACETAMINOPHEN 975 MILLIGRAM(S): 160 SUSPENSION ORAL at 05:18

## 2025-02-11 RX ADMIN — CEFTRIAXONE 100 MILLIGRAM(S): 250 INJECTION, POWDER, FOR SOLUTION INTRAMUSCULAR; INTRAVENOUS at 21:44

## 2025-02-11 RX ADMIN — ACETAMINOPHEN 975 MILLIGRAM(S): 160 SUSPENSION ORAL at 12:43

## 2025-02-11 RX ADMIN — SODIUM CHLORIDE 125 MILLILITER(S): 9 INJECTION, SOLUTION INTRAVENOUS at 23:59

## 2025-02-11 RX ADMIN — ACETAMINOPHEN 975 MILLIGRAM(S): 160 SUSPENSION ORAL at 05:48

## 2025-02-11 RX ADMIN — ACETAMINOPHEN 975 MILLIGRAM(S): 160 SUSPENSION ORAL at 17:38

## 2025-02-11 RX ADMIN — Medication 5000 UNIT(S): at 05:18

## 2025-02-11 NOTE — PROGRESS NOTE ADULT - ASSESSMENT
A/P 54 year old male s/p PCNL    -AM labs  -CT scan later this morning  -continue IV abx  -IV fluids  -OOB   -Plan for second stage PCNL on Thursday 2/13/25   A/P 54 year old male s/p PCNL 2/10    -AM labs  -CT scan later this morning  -continue IV abx  -IV fluids  -OOB   -Plan for second stage PCNL on Thursday 2/13/25

## 2025-02-11 NOTE — PROVIDER CONTACT NOTE (OTHER) - ACTION/TREATMENT ORDERED:
Pt educated on the purpose of DVT prophylaxis. Pt verbalizes understanding and continues to refuse. Encourage use of PAS device while in Bed/chair.

## 2025-02-11 NOTE — PROGRESS NOTE ADULT - SUBJECTIVE AND OBJECTIVE BOX
UROLOGY DAILY PROGRESS NOTE:     Subjective: Patient seen and examined at bedside. No overnight events.       Objective:  Vital signs  T(F): , Max: 98.1 (25 @ 01:24)  HR: 69 (25 @ 05:20)  BP: 135/82 (25 @ 05:20)  SpO2: 96% (25 @ 05:20)  Wt(kg): --    I&O's Summary  Indwelling catheter draininml  Nephrostomy tube draininml      Gen: NAD  Pulm: No respiratory distress, no subcostal retractions  : Mueller draining punch colored urine. Nephrostomy draining maroon colored fluid. Saturated dressing changed    Labs:  02-10  11.34 / 34.3  /1.22                           11.3   11.34 )-----------( 167      ( 10 Feb 2025 12:51 )             34.3     02-10    143  |  111[H]  |  14  ----------------------------<  133[H]  4.4   |  22  |  1.22    Ca    8.1[L]      10 Feb 2025 12:51         UROLOGY DAILY PROGRESS NOTE:     Subjective: Patient seen and examined at bedside. No overnight events.       Objective:  Vital signs  T(F): , Max: 98.1 (25 @ 01:24)  HR: 69 (25 @ 05:20)  BP: 135/82 (25 @ 05:20)  SpO2: 96% (25 @ 05:20)  Wt(kg): --    I&O's Summary  Indwelling catheter draininml  Nephrostomy tube draininml      Gen: NAD  Pulm: No respiratory distress, no subcostal retractions  : Mueller draining punch colored urine. Nephrostomy draining maroon colored fluid. Saturated dressing changed    Labs:  02-10  11.34 / 34.3  /1.22                           11.3   11 )-----------( 167      ( 10 Feb 2025 12:51 )             34.3     02-10    143  |  111[H]  |  14  ----------------------------<  133[H]  4.4   |  22  |  1.22    Ca    8.1[L]      10 Feb 2025 12:51          CXR (2/10): no pneumothorax

## 2025-02-11 NOTE — PROVIDER CONTACT NOTE (OTHER) - ASSESSMENT
Pt refusing heparin injection d/t complaints of chest pain right after administration of medication. Pt denies chest pain/burning or SOB currently at this time. VS as per flowsheet.

## 2025-02-12 LAB
ANION GAP SERPL CALC-SCNC: 10 MMOL/L — SIGNIFICANT CHANGE UP (ref 5–17)
BUN SERPL-MCNC: 17 MG/DL — SIGNIFICANT CHANGE UP (ref 7–23)
CALCIUM SERPL-MCNC: 8.9 MG/DL — SIGNIFICANT CHANGE UP (ref 8.4–10.5)
CHLORIDE SERPL-SCNC: 107 MMOL/L — SIGNIFICANT CHANGE UP (ref 96–108)
CO2 SERPL-SCNC: 22 MMOL/L — SIGNIFICANT CHANGE UP (ref 22–31)
CREAT SERPL-MCNC: 1.23 MG/DL — SIGNIFICANT CHANGE UP (ref 0.5–1.3)
CULTURE RESULTS: NO GROWTH — SIGNIFICANT CHANGE UP
CULTURE RESULTS: SIGNIFICANT CHANGE UP
CULTURE RESULTS: SIGNIFICANT CHANGE UP
EGFR: 70 ML/MIN/1.73M2 — SIGNIFICANT CHANGE UP
GLUCOSE SERPL-MCNC: 97 MG/DL — SIGNIFICANT CHANGE UP (ref 70–99)
HCT VFR BLD CALC: 31.7 % — LOW (ref 39–50)
HGB BLD-MCNC: 10.4 G/DL — LOW (ref 13–17)
MCHC RBC-ENTMCNC: 30.6 PG — SIGNIFICANT CHANGE UP (ref 27–34)
MCHC RBC-ENTMCNC: 32.8 G/DL — SIGNIFICANT CHANGE UP (ref 32–36)
MCV RBC AUTO: 93.2 FL — SIGNIFICANT CHANGE UP (ref 80–100)
NRBC BLD AUTO-RTO: 0 /100 WBCS — SIGNIFICANT CHANGE UP (ref 0–0)
PLATELET # BLD AUTO: 148 K/UL — LOW (ref 150–400)
POTASSIUM SERPL-MCNC: 4 MMOL/L — SIGNIFICANT CHANGE UP (ref 3.5–5.3)
POTASSIUM SERPL-SCNC: 4 MMOL/L — SIGNIFICANT CHANGE UP (ref 3.5–5.3)
RBC # BLD: 3.4 M/UL — LOW (ref 4.2–5.8)
RBC # FLD: 12.9 % — SIGNIFICANT CHANGE UP (ref 10.3–14.5)
SODIUM SERPL-SCNC: 139 MMOL/L — SIGNIFICANT CHANGE UP (ref 135–145)
SPECIMEN SOURCE: SIGNIFICANT CHANGE UP
WBC # BLD: 12.28 K/UL — HIGH (ref 3.8–10.5)
WBC # FLD AUTO: 12.28 K/UL — HIGH (ref 3.8–10.5)

## 2025-02-12 RX ORDER — SODIUM CHLORIDE 9 G/ML
1000 INJECTION, SOLUTION INTRAVENOUS
Refills: 0 | Status: DISCONTINUED | OUTPATIENT
Start: 2025-02-12 | End: 2025-02-13

## 2025-02-12 RX ADMIN — CEFTRIAXONE 100 MILLIGRAM(S): 250 INJECTION, POWDER, FOR SOLUTION INTRAMUSCULAR; INTRAVENOUS at 22:22

## 2025-02-12 RX ADMIN — ACETAMINOPHEN 975 MILLIGRAM(S): 160 SUSPENSION ORAL at 12:38

## 2025-02-12 RX ADMIN — ACETAMINOPHEN 975 MILLIGRAM(S): 160 SUSPENSION ORAL at 17:18

## 2025-02-12 RX ADMIN — ACETAMINOPHEN 975 MILLIGRAM(S): 160 SUSPENSION ORAL at 05:33

## 2025-02-12 RX ADMIN — ACETAMINOPHEN 975 MILLIGRAM(S): 160 SUSPENSION ORAL at 05:03

## 2025-02-12 NOTE — PRE PROCEDURE NOTE - PRE PROCEDURE EVALUATION
Urology Preop Note    Diagnosis: nephrolithiasis  Procedure: 2nd look L PCNL  Surgeon: Hoenig                          10.4   12.28 )-----------( 148      ( 12 Feb 2025 06:47 )             31.7       02-12    139  |  107  |  17  ----------------------------<  97  4.0   |  22  |  1.23    Ca    8.9      12 Feb 2025 06:47            Urinalysis Basic - ( 12 Feb 2025 06:47 )    Color: x / Appearance: x / SG: x / pH: x  Gluc: 97 mg/dL / Ketone: x  / Bili: x / Urobili: x   Blood: x / Protein: x / Nitrite: x   Leuk Esterase: x / RBC: x / WBC x   Sq Epi: x / Non Sq Epi: x / Bacteria: x      UCx:  Culture - Wound Aerobic (02.10.25 @ 14:42)   Specimen Source: .Other  Culture Results:   No growth    Culture - Urine (02.10.25 @ 14:38)   Specimen Source: Kidney  Culture Results:   No growth to date.      EKG:    Ventricular Rate 69 BPM    Atrial Rate 69 BPM    P-R Interval 126 ms    QRS Duration 86 ms    Q-T Interval 382 ms    QTC Calculation(Bazett) 409 ms    P Axis 50 degrees    R Axis 17 degrees    T Axis -4 degrees    Diagnosis Line Normal sinus rhythm  Nonspecific ST and T wave abnormality  Abnormal ECG    CXR:    ACC: [id number] EXAM:  XR CHEST PORTABLE URGENT 1V   ORDERED BY: ANTHONY INGRAM     PROCEDURE DATE:  02/10/2025          INTERPRETATION:  CLINICAL INDICATION: Status post left PCNL, evaluate for   pneumothorax    EXAM: Frontal radiograph of the chest.    COMPARISON: Chest radiograph from 12/30/2021    FINDINGS:  No focal consolidation.  Left basilar atelectasis.  There is no pleural effusion or pneumothorax.  The heart is not well evaluated in this position  The visualized osseous structures demonstrate no acute pathology.    IMPRESSION:  No pneumothorax.    --- End of Report ---          BORIS LUO DO; Resident Radiologist  This document has been electronically signed.  ERIC GAXIOLA MD; Attending Radiologist  This document has been electronically signed. Feb 10 2025  9:55PM      Orders:  NPO after midnight  IVF after midnight   Consent obtained  Clearance  Type & Screen  Anti-coagulation held  2 units on hold for OR

## 2025-02-12 NOTE — PROGRESS NOTE ADULT - ASSESSMENT
A/P: 54 year old male s/p L PCNL 2/10/25 POD #2    -AM labs   -Continue CTX  -IV fluids  -OOB  -Obtain consent for second stage PCNL for Thursday 2/13/25 A/P: 54 year old male s/p L PCNL 2/10/25 POD #2    -AM labs   -Continue CTX  -IV fluids  -OOB/DVT ppx  -preop and consent for second stage PCNL for Thursday 2/13/25

## 2025-02-12 NOTE — PROGRESS NOTE ADULT - SUBJECTIVE AND OBJECTIVE BOX
UROLOGY DAILY PROGRESS NOTE:     Subjective: Patient seen and examined at bedside. No overnight events.       Objective:  Vital signs  T(F): , Max: 99.1 (25 @ 13:35)  HR: 70 (25 @ 05:00)  BP: 144/89 (25 @ 05:00)  SpO2: 98% (25 @ 05:00)  Wt(kg): --    I&O's Summary  7am shift  Llanes draininml  Nephrostomy Tube draininml          Gen: NAD  Pulm: No respiratory distress, no subcostal retractions  : llanes draining punch colored urine. NT draining dark red fluid. NT dressing changed.    Labs:    17.10 / 33.2  /1.48   02-10  11.34 / 34.3  /1.22                           10.8   17.10 )-----------( 166      ( 2025 07:29 )             33.2         144  |  112[H]  |  <4[L]  ----------------------------<  89  4.1   |  22  |  1.48[H]    Ca    8.8      2025 07:29   UROLOGY DAILY PROGRESS NOTE:     Subjective: Patient seen and examined at bedside. No overnight events.       Objective:  Vital signs  T(F): , Max: 99.1 (25 @ 13:35)  HR: 70 (25 @ 05:00)  BP: 144/89 (25 @ 05:00)  SpO2: 98% (25 @ 05:00)  Wt(kg): --    I&O's Summary  7am shift  Llanes draininml  Nephrostomy Tube draininml          Gen: NAD  Pulm: No respiratory distress, no subcostal retractions  : llanes draining punch colored urine. NT draining dark red fluid. NT dressing changed.    Labs:    17.10 / 33.2  /1.48   02-10  11.34 / 34.3  /1.22                           10.8   17.10 )-----------( 166      ( 2025 07:29 )             33.2         144  |  112[H]  |  <4[L]  ----------------------------<  89  4.1   |  22  |  1.48[H]    Ca    8.8      2025 07:29      CT (): Postsurgical changes left kidney. High density within the dilated left   upper pole calyx could represent blood products or contrast.    Bilateral nephrolithiasis including bulky calculus right renal pelvis

## 2025-02-13 ENCOUNTER — RESULT REVIEW (OUTPATIENT)
Age: 55
End: 2025-02-13

## 2025-02-13 ENCOUNTER — APPOINTMENT (OUTPATIENT)
Dept: UROLOGY | Facility: HOSPITAL | Age: 55
End: 2025-02-13

## 2025-02-13 ENCOUNTER — TRANSCRIPTION ENCOUNTER (OUTPATIENT)
Age: 55
End: 2025-02-13

## 2025-02-13 LAB
ANION GAP SERPL CALC-SCNC: 14 MMOL/L — SIGNIFICANT CHANGE UP (ref 5–17)
BLD GP AB SCN SERPL QL: NEGATIVE — SIGNIFICANT CHANGE UP
BUN SERPL-MCNC: 16 MG/DL — SIGNIFICANT CHANGE UP (ref 7–23)
CALCIUM SERPL-MCNC: 9 MG/DL — SIGNIFICANT CHANGE UP (ref 8.4–10.5)
CHLORIDE SERPL-SCNC: 107 MMOL/L — SIGNIFICANT CHANGE UP (ref 96–108)
CO2 SERPL-SCNC: 21 MMOL/L — LOW (ref 22–31)
CREAT SERPL-MCNC: 1.17 MG/DL — SIGNIFICANT CHANGE UP (ref 0.5–1.3)
EGFR: 74 ML/MIN/1.73M2 — SIGNIFICANT CHANGE UP
GLUCOSE SERPL-MCNC: 101 MG/DL — HIGH (ref 70–99)
HCT VFR BLD CALC: 33.3 % — LOW (ref 39–50)
HGB BLD-MCNC: 10.9 G/DL — LOW (ref 13–17)
MCHC RBC-ENTMCNC: 30.9 PG — SIGNIFICANT CHANGE UP (ref 27–34)
MCHC RBC-ENTMCNC: 32.7 G/DL — SIGNIFICANT CHANGE UP (ref 32–36)
MCV RBC AUTO: 94.3 FL — SIGNIFICANT CHANGE UP (ref 80–100)
NRBC BLD AUTO-RTO: 0 /100 WBCS — SIGNIFICANT CHANGE UP (ref 0–0)
PLATELET # BLD AUTO: 166 K/UL — SIGNIFICANT CHANGE UP (ref 150–400)
POTASSIUM SERPL-MCNC: 3.9 MMOL/L — SIGNIFICANT CHANGE UP (ref 3.5–5.3)
POTASSIUM SERPL-SCNC: 3.9 MMOL/L — SIGNIFICANT CHANGE UP (ref 3.5–5.3)
RBC # BLD: 3.53 M/UL — LOW (ref 4.2–5.8)
RBC # FLD: 12.9 % — SIGNIFICANT CHANGE UP (ref 10.3–14.5)
RH IG SCN BLD-IMP: POSITIVE — SIGNIFICANT CHANGE UP
SODIUM SERPL-SCNC: 142 MMOL/L — SIGNIFICANT CHANGE UP (ref 135–145)
WBC # BLD: 10.52 K/UL — HIGH (ref 3.8–10.5)
WBC # FLD AUTO: 10.52 K/UL — HIGH (ref 3.8–10.5)

## 2025-02-13 PROCEDURE — 52352 CYSTOURETERO W/STONE REMOVE: CPT | Mod: LT,59,58

## 2025-02-13 PROCEDURE — 50561 KIDNEY ENDOSCOPY & TREATMENT: CPT | Mod: LT,58

## 2025-02-13 PROCEDURE — 88300 SURGICAL PATH GROSS: CPT | Mod: 26

## 2025-02-13 PROCEDURE — 50431 NJX PX NFROSGRM &/URTRGRM: CPT | Mod: LT,59,58

## 2025-02-13 DEVICE — STONE BASKET ZEROTIP NITINOL 4-WIRE 1.9FR 120CM X 12MM
Type: IMPLANTABLE DEVICE | Site: LEFT | Status: NON-FUNCTIONAL
Removed: 2025-02-13

## 2025-02-13 RX ORDER — CEFTRIAXONE 250 MG/1
1000 INJECTION, POWDER, FOR SOLUTION INTRAMUSCULAR; INTRAVENOUS EVERY 24 HOURS
Refills: 0 | Status: DISCONTINUED | OUTPATIENT
Start: 2025-02-13 | End: 2025-02-14

## 2025-02-13 RX ORDER — HEPARIN SODIUM,PORCINE 10000/ML
5000 VIAL (ML) INJECTION EVERY 12 HOURS
Refills: 0 | Status: DISCONTINUED | OUTPATIENT
Start: 2025-02-13 | End: 2025-02-14

## 2025-02-13 RX ORDER — HYDROMORPHONE HYDROCHLORIDE 4 MG/ML
0.5 INJECTION, SOLUTION INTRAMUSCULAR; INTRAVENOUS; SUBCUTANEOUS
Refills: 0 | Status: DISCONTINUED | OUTPATIENT
Start: 2025-02-13 | End: 2025-02-13

## 2025-02-13 RX ORDER — OXYCODONE HYDROCHLORIDE 30 MG/1
5 TABLET ORAL EVERY 8 HOURS
Refills: 0 | Status: DISCONTINUED | OUTPATIENT
Start: 2025-02-13 | End: 2025-02-14

## 2025-02-13 RX ORDER — SODIUM CHLORIDE 9 G/ML
1000 INJECTION, SOLUTION INTRAVENOUS
Refills: 0 | Status: DISCONTINUED | OUTPATIENT
Start: 2025-02-13 | End: 2025-02-14

## 2025-02-13 RX ORDER — ONDANSETRON 4 MG/1
4 TABLET, ORALLY DISINTEGRATING ORAL ONCE
Refills: 0 | Status: DISCONTINUED | OUTPATIENT
Start: 2025-02-13 | End: 2025-02-13

## 2025-02-13 RX ORDER — ACETAMINOPHEN 160 MG/5ML
975 SUSPENSION ORAL EVERY 6 HOURS
Refills: 0 | Status: DISCONTINUED | OUTPATIENT
Start: 2025-02-13 | End: 2025-02-14

## 2025-02-13 RX ADMIN — SODIUM CHLORIDE 75 MILLILITER(S): 9 INJECTION, SOLUTION INTRAVENOUS at 00:08

## 2025-02-13 RX ADMIN — ACETAMINOPHEN 975 MILLIGRAM(S): 160 SUSPENSION ORAL at 18:41

## 2025-02-13 RX ADMIN — ACETAMINOPHEN 975 MILLIGRAM(S): 160 SUSPENSION ORAL at 05:35

## 2025-02-13 RX ADMIN — CEFTRIAXONE 100 MILLIGRAM(S): 250 INJECTION, POWDER, FOR SOLUTION INTRAMUSCULAR; INTRAVENOUS at 22:17

## 2025-02-13 RX ADMIN — ACETAMINOPHEN 975 MILLIGRAM(S): 160 SUSPENSION ORAL at 06:35

## 2025-02-13 RX ADMIN — SODIUM CHLORIDE 125 MILLILITER(S): 9 INJECTION, SOLUTION INTRAVENOUS at 16:00

## 2025-02-13 RX ADMIN — ACETAMINOPHEN 975 MILLIGRAM(S): 160 SUSPENSION ORAL at 17:41

## 2025-02-13 RX ADMIN — ACETAMINOPHEN 975 MILLIGRAM(S): 160 SUSPENSION ORAL at 11:31

## 2025-02-13 NOTE — PRE-ANESTHESIA EVALUATION ADULT - NSANTHNECKRD_ENT_A_CORE
Hand, Foot, and Mouth Disease, Pediatric    Hand, foot, and mouth disease is an illness that is caused by a virus. The illness causes a sore throat, sores in the mouth, fever, and a rash on the hands and feet. It is usually not serious. Most children get better within 1-2 weeks.  This illness can spread easily (is contagious). It can be spread through contact with:  · Snot (nasal discharge) of an infected person.  · Spit (saliva) of an infected person.  · Poop (stool) of an infected person.  Follow these instructions at home:  Managing mouth pain and discomfort  · Do not use products that contain benzocaine (including numbing gels) to treat teething or mouth pain in children who are younger than 2 years old. These products may cause a rare but serious blood condition.  · If your child is old enough to rinse and spit, have your child rinse his or her mouth with a salt-water mixture 3-4 times a day or as needed. To make a salt-water mixture, completely dissolve ½-1 tsp of salt in 1 cup of warm water. This can help to reduce pain from the mouth sores. Your child's doctor may also recommend other rinse solutions to treat mouth sores.  · Take these actions to help reduce your child's discomfort when he or she is eating or drinking:  ? Have your child eat soft foods.  ? Have your child avoid foods and drinks that are salty, spicy, or acidic, like pickles and orange juice.  ? Give your child cold food and drinks. These may include water, sport drinks, milk, milkshakes, frozen ice pops, slushies, and sherbets.  ? If breastfeeding or bottle-feeding seems to cause pain:  § Feed your baby with a syringe instead.  § Feed your young child with a cup, spoon, or syringe instead.  Helping with pain, itching, and discomfort in rash areas  · Keep your child cool and out of the sun. Sweating and being hot can make itching worse.  · Cool baths can help. Try adding baking soda or dry oatmeal to the water. Do not bathe your child in hot 
water.  · Put cold, wet cloths (cold compresses) on itchy areas, as told by your child's doctor.  · Use calamine lotion as told by your child's doctor. This is an over-the-counter lotion that helps with itchiness.  · Make sure your child does not scratch or pick at the rash. To help prevent scratching:  ? Keep your child's fingernails clean and cut short.  ? Have your child wear soft gloves or mittens when he or she sleeps, if scratching is a problem.  General instructions  · Have your child rest and return to normal activities as told by his or her doctor. Ask your child's doctor what activities are safe for your child.  · Give or apply over-the-counter and prescription medicines only as told by your child's doctor.  ? Do not give your child aspirin.  ? Talk with your child's doctor if you have questions about benzocaine. This is a type of pain medicine that often comes as a gel to be rubbed on the body. Benzocaine may cause a serious blood condition in some children.  · Wash your hands and your child's hands often. If you cannot use soap and water, use hand .  · Keep your child away from  programs, schools, or other group settings for a few days or until the fever is gone.  · Keep all follow-up visits as told by your child's doctor. This is important.  Contact a doctor if:  · Your child's symptoms do not get better within 2 weeks.  · Your child's symptoms get worse.  · Your child has pain that is not helped by medicine.  · Your child is very fussy.  · Your child has trouble swallowing.  · Your child is drooling a lot.  · Your child has sores or blisters on the lips or outside of the mouth.  · Your child has a fever for more than 3 days.  Get help right away if:  · Your child has signs of body fluid loss (dehydration):  ? Peeing (urinating) only very small amounts or peeing fewer than 3 times in 24 hours.  ? Pee (urine) that is very dark.  ? Dry mouth, tongue, or lips.  ? Decreased tears or 
sunken eyes.  ? Dry skin.  ? Fast breathing.  ? Decreased activity or being very sleepy.  ? Poor color or pale skin.  ? Fingertips taking more than 2 seconds to turn pink again after a gentle squeeze.  ? Weight loss.  · Your child who is younger than 3 months has a temperature of 100°F (38°C) or higher.  · Your child has a bad headache or a stiff neck.  · Your child has a change in behavior.  · Your child has chest pain or has trouble breathing.  Summary  · Hand, foot, and mouth disease is an illness that is caused by a virus. It causes a sore throat, sores in the mouth, fever, and a rash on the hands and feet.  · Most children get better within 1-2 weeks.  · Give or apply over-the-counter and prescription medicines only as told by your child's doctor.  · Call a doctor if your child's symptoms get worse or do not get better within 2 weeks.  This information is not intended to replace advice given to you by your health care provider. Make sure you discuss any questions you have with your health care provider.  Document Revised: 12/21/2018 Document Reviewed: 09/12/2018  [employer] Patient Education © 2021 [employer]    
No
No

## 2025-02-13 NOTE — PRE-ANESTHESIA EVALUATION ADULT - NSANTHPEFT_GEN_ALL_CORE
GENERAL: NAD  HEAD:  Atraumatic, Normocephalic  CHEST/LUNG: Clear to auscultation bilaterally  HEART: Normal S1/S2  PSYCH: AAOx3
FROM of neck, TMD>3FB, Good mouth opening  COR: S1S2  Lungs: CTA

## 2025-02-13 NOTE — PROGRESS NOTE ADULT - ASSESSMENT
A/P: 54 year old male s/p L PCNL 2/10/25 POD #3  -AM labs   -Continue CTX  -IV fluids/npo  -OOB/DVT ppx  -OR today for second stage L PCNL

## 2025-02-13 NOTE — BRIEF OPERATIVE NOTE - NSICDXBRIEFPROCEDURE_GEN_ALL_CORE_FT
PROCEDURES:  Nephrolithotomy, second look, percutaneous, using laser 13-Feb-2025 15:22:48  Gagandeep Bull

## 2025-02-13 NOTE — BRIEF OPERATIVE NOTE - OPERATION/FINDINGS
Second look L PCNL with stone removal and antegrade URS. Stones in kidney and ureter removed. No stent placed. No NT placed. Mueller in place

## 2025-02-13 NOTE — PRE-ANESTHESIA EVALUATION ADULT - NSDENTALSD_ENT_ALL_CORE
states he has permanent partial dentures, nothing loose or removable/appears normal and intact
appears normal and intact/caps/bridge/implants

## 2025-02-13 NOTE — PRE-ANESTHESIA EVALUATION ADULT - NSPROPOSEDPROCEDFT_GEN_ALL_CORE
Cysto, Nephroscopy with stone removal
Stage 1 Cystoscopy, b/l retrograde pyelogram, L percutaneous stone removal, R stent placement

## 2025-02-13 NOTE — PROGRESS NOTE ADULT - SUBJECTIVE AND OBJECTIVE BOX
Note    Post op Check    s/p : second look L PCNL    Pt seen / examined without complaints pain controlled    Vital Signs Last 24 Hrs  T(C): 36.5 (13 Feb 2025 17:09), Max: 37.3 (13 Feb 2025 05:43)  T(F): 97.7 (13 Feb 2025 17:09), Max: 99.2 (13 Feb 2025 05:43)  HR: 76 (13 Feb 2025 17:09) (61 - 81)  BP: 137/84 (13 Feb 2025 17:09) (122/73 - 154/87)  BP(mean): 108 (13 Feb 2025 16:30) (96 - 114)  RR: 17 (13 Feb 2025 17:09) (16 - 18)  SpO2: 94% (13 Feb 2025 17:09) (94% - 99%)    Parameters below as of 13 Feb 2025 17:09  Patient On (Oxygen Delivery Method): room air        I&O's Summary    12 Feb 2025 07:01  -  13 Feb 2025 07:00  --------------------------------------------------------  IN: 1020 mL / OUT: 2125 mL / NET: -1105 mL    13 Feb 2025 07:01  -  13 Feb 2025 17:43  --------------------------------------------------------  IN: 250 mL / OUT: 800 mL / NET: -550 mL        PHYSICAL EXAM:       Constitutional: Sitting comfortably pain well managed       Cardiovascular: RRR    Gastrointestinal: soft NTND    Genitourinary: L flank dressing in place mild amount light pink dc  Mueller light pink  Extremities: SCD in place                                         10.9   10.52 )-----------( 166      ( 13 Feb 2025 07:30 )             33.3       02-13    142  |  107  |  16  ----------------------------<  101[H]  3.9   |  21[L]  |  1.17    Ca    9.0      13 Feb 2025 07:28                Strict I&O's  Analgesia Antiemetics  DVT prophyllaxis  Incentive spirometry  Clears / OOB  AM labs

## 2025-02-13 NOTE — PROGRESS NOTE ADULT - ASSESSMENT
A/P: 54 year old male s/p L PCNL 2nd stage 2/13 no stent and NT removed, L PCNL  2/10/25 POD #3  -AM labs   -Continue CTX until dcd  no antibioticson dc  -IV fluids  -OOB/DVT ppx  -TOV in Am plan for dc

## 2025-02-13 NOTE — PROGRESS NOTE ADULT - SUBJECTIVE AND OBJECTIVE BOX
UROLOGY DAILY PROGRESS NOTE:     Subjective: Patient seen and examined at bedside. No overnight events. Offers no acute sx or complaints.      Objective:  Vital signs  T(F): , Max: 99.2 (02-13-25 @ 05:43)  HR: 81 (02-13-25 @ 05:43)  BP: 122/73 (02-13-25 @ 05:43)  SpO2: 98% (02-13-25 @ 05:43)  Wt(kg): --    I&O's Summary    11 Feb 2025 07:01  -  12 Feb 2025 07:00  --------------------------------------------------------  IN: 3275 mL / OUT: 2175 mL / NET: 1100 mL    12 Feb 2025 07:01  -  13 Feb 2025 06:49  --------------------------------------------------------  IN: 470 mL / OUT: 2125 mL / NET: -1655 mL        Gen: NAD  Pulm: No respiratory distress, no subcostal retractions  Abd: Soft, NT, ND  : llanes in place, urine yellow, L NT punch colored, dressing changed at bedside    Labs:  02-12 12.28 / 31.7  /1.23   02-11  17.10 / 33.2  /1.48                           10.4   12.28 )-----------( 148      ( 12 Feb 2025 06:47 )             31.7     02-12    139  |  107  |  17  ----------------------------<  97  4.0   |  22  |  1.23    Ca    8.9      12 Feb 2025 06:47          Urine Cx:  Culture - Urine (02.10.25 @ 14:38)   Specimen Source: Kidney  Culture Results:   No growth at 48 hours

## 2025-02-13 NOTE — PRE-ANESTHESIA EVALUATION ADULT - NSANTHOSAYNRD_GEN_A_CORE
No. LOVE screening performed.  STOP BANG Legend: 0-2 = LOW Risk; 3-4 = INTERMEDIATE Risk; 5-8 = HIGH Risk
No. LOVE screening performed.  STOP BANG Legend: 0-2 = LOW Risk; 3-4 = INTERMEDIATE Risk; 5-8 = HIGH Risk

## 2025-02-14 ENCOUNTER — TRANSCRIPTION ENCOUNTER (OUTPATIENT)
Age: 55
End: 2025-02-14

## 2025-02-14 VITALS
SYSTOLIC BLOOD PRESSURE: 155 MMHG | OXYGEN SATURATION: 98 % | HEART RATE: 68 BPM | TEMPERATURE: 98 F | DIASTOLIC BLOOD PRESSURE: 90 MMHG | RESPIRATION RATE: 18 BRPM

## 2025-02-14 LAB
ANION GAP SERPL CALC-SCNC: 10 MMOL/L — SIGNIFICANT CHANGE UP (ref 5–17)
BUN SERPL-MCNC: 16 MG/DL — SIGNIFICANT CHANGE UP (ref 7–23)
CALCIUM SERPL-MCNC: 9.3 MG/DL — SIGNIFICANT CHANGE UP (ref 8.4–10.5)
CHLORIDE SERPL-SCNC: 106 MMOL/L — SIGNIFICANT CHANGE UP (ref 96–108)
CO2 SERPL-SCNC: 24 MMOL/L — SIGNIFICANT CHANGE UP (ref 22–31)
CREAT SERPL-MCNC: 1.27 MG/DL — SIGNIFICANT CHANGE UP (ref 0.5–1.3)
EGFR: 67 ML/MIN/1.73M2 — SIGNIFICANT CHANGE UP
GLUCOSE SERPL-MCNC: 104 MG/DL — HIGH (ref 70–99)
HCT VFR BLD CALC: 32.9 % — LOW (ref 39–50)
HGB BLD-MCNC: 10.6 G/DL — LOW (ref 13–17)
MCHC RBC-ENTMCNC: 29.7 PG — SIGNIFICANT CHANGE UP (ref 27–34)
MCHC RBC-ENTMCNC: 32.2 G/DL — SIGNIFICANT CHANGE UP (ref 32–36)
MCV RBC AUTO: 92.2 FL — SIGNIFICANT CHANGE UP (ref 80–100)
NRBC BLD AUTO-RTO: 0 /100 WBCS — SIGNIFICANT CHANGE UP (ref 0–0)
PLATELET # BLD AUTO: 189 K/UL — SIGNIFICANT CHANGE UP (ref 150–400)
POTASSIUM SERPL-MCNC: 4.3 MMOL/L — SIGNIFICANT CHANGE UP (ref 3.5–5.3)
POTASSIUM SERPL-SCNC: 4.3 MMOL/L — SIGNIFICANT CHANGE UP (ref 3.5–5.3)
RBC # BLD: 3.57 M/UL — LOW (ref 4.2–5.8)
RBC # FLD: 12.5 % — SIGNIFICANT CHANGE UP (ref 10.3–14.5)
SODIUM SERPL-SCNC: 140 MMOL/L — SIGNIFICANT CHANGE UP (ref 135–145)
WBC # BLD: 12.47 K/UL — HIGH (ref 3.8–10.5)
WBC # FLD AUTO: 12.47 K/UL — HIGH (ref 3.8–10.5)

## 2025-02-14 PROCEDURE — 86850 RBC ANTIBODY SCREEN: CPT

## 2025-02-14 PROCEDURE — C9399: CPT

## 2025-02-14 PROCEDURE — 71045 X-RAY EXAM CHEST 1 VIEW: CPT

## 2025-02-14 PROCEDURE — 87086 URINE CULTURE/COLONY COUNT: CPT

## 2025-02-14 PROCEDURE — C2625: CPT

## 2025-02-14 PROCEDURE — 76000 FLUOROSCOPY <1 HR PHYS/QHP: CPT

## 2025-02-14 PROCEDURE — C1758: CPT

## 2025-02-14 PROCEDURE — C1889: CPT

## 2025-02-14 PROCEDURE — 86901 BLOOD TYPING SEROLOGIC RH(D): CPT

## 2025-02-14 PROCEDURE — 85027 COMPLETE CBC AUTOMATED: CPT

## 2025-02-14 PROCEDURE — C1887: CPT

## 2025-02-14 PROCEDURE — 80048 BASIC METABOLIC PNL TOTAL CA: CPT

## 2025-02-14 PROCEDURE — 88300 SURGICAL PATH GROSS: CPT

## 2025-02-14 PROCEDURE — 87070 CULTURE OTHR SPECIMN AEROBIC: CPT

## 2025-02-14 PROCEDURE — C1726: CPT

## 2025-02-14 PROCEDURE — 86900 BLOOD TYPING SEROLOGIC ABO: CPT

## 2025-02-14 PROCEDURE — 82365 CALCULUS SPECTROSCOPY: CPT

## 2025-02-14 PROCEDURE — 74176 CT ABD & PELVIS W/O CONTRAST: CPT | Mod: MC

## 2025-02-14 PROCEDURE — C1769: CPT

## 2025-02-14 RX ORDER — ACETAMINOPHEN 160 MG/5ML
3 SUSPENSION ORAL
Qty: 0 | Refills: 0 | DISCHARGE
Start: 2025-02-14

## 2025-02-14 RX ADMIN — ACETAMINOPHEN 975 MILLIGRAM(S): 160 SUSPENSION ORAL at 05:03

## 2025-02-14 RX ADMIN — SODIUM CHLORIDE 125 MILLILITER(S): 9 INJECTION, SOLUTION INTRAVENOUS at 05:05

## 2025-02-14 RX ADMIN — ACETAMINOPHEN 975 MILLIGRAM(S): 160 SUSPENSION ORAL at 05:35

## 2025-02-14 NOTE — DISCHARGE NOTE PROVIDER - CARE PROVIDER_API CALL
Hoenig, David Mayer  Urology  [address] [place] [address]- [place] [address]  Phone: [phone]  Fax: [phone]  Follow Up Time: 2 weeks

## 2025-02-14 NOTE — DISCHARGE NOTE PROVIDER - HOSPITAL COURSE
54M admitted on 2/10 for L PCNL with Dr Hoenig. Post op had llanes and PCN. CT performed which showed residual stone as well as R staghorn. llanes removed and passed TOV. Pt returned to OR on 2/13 for second stage L PCNL. PCN removed in OR. Following procedure had left stent and llanes. Llanes removed 2/14 and pt passed TOV. Had excessive urine leakage out of back and area was pouched. Pt did well post op. At the time of discharge, the patient was hemodynamically stable, was tolerating PO diet, was voiding urine and passing stool, was ambulating, and was comfortable with adequate pain control. Patient/family felt safe with being discharged, and understood and agreed with plan. Per attending Dr. Hoenig, patient is stable for discharge to home with outpatient follow up. Patient instructed to call office to make follow up appointment.

## 2025-02-14 NOTE — DISCHARGE NOTE PROVIDER - CARE PROVIDERS DIRECT ADDRESSES
[email or web address]

## 2025-02-14 NOTE — DISCHARGE NOTE PROVIDER - NSDCFUADDINST_GEN_ALL_CORE_FT
PAIN CONTROL: You may take 650 mg of Tylenol every 4-6 hours. Do not exceed more than 4000mg or 4 grams of Tylenol daily. You may take Tylenol and Ibuprofen as needed for pain. If you were prescribed narcotic pain medication, take as directed. You should also take senna to prevent constipation.    STENT: You may have intermittent pink tinged urine and slight flank pain when you urinate.  This is normal and due to the stent in your ureter. It is not uncommon to have some burning when you urinate.  Some patients do not feel any discomfort from the stent, while others may feel the sensation of needing to urinate frequently, burning on urination, or even back pain that worsens with urination. These symptoms usually improve gradually, however it may be necessary to take pain medication until the discomfort subsides.  If you are unable to urinate or your urine becomes bright red or with clots, please call the office. Please make sure you drink plenty of fluids.    BATHING: Please do not submerge wound underwater. You may shower and/or sponge bathe.    ACTIVITY: No heavy lifting or straining. Otherwise, you may return to your usual level of physical activity. If you are taking narcotic pain medications (such as oxycodone), do NOT drive a car, operate machinery or make important decisions.    DIET: Return to your usual diet    NOTIFY YOUR SURGEON IF: You have any bleeding that does not stop, any fevers (over 100.4F) or chills, persistent nausea/vomiting, persistent diarrhea, your pain is not controlled on your discharge pain medications, heavy bleeding in the urine, inability to urinate, or other worrisome symptoms arise.    FOLLOW UP:  1. Please call your surgeon to make a follow up appointment within two weeks of discharge  2: Please follow up with your primary care physician within 1-2 weeks regarding your hospitalization.

## 2025-02-14 NOTE — DISCHARGE NOTE NURSING/CASE MANAGEMENT/SOCIAL WORK - FINANCIAL ASSISTANCE
[facility] provides services at a reduced cost to those who are determined to be eligible through [facility]’s financial assistance program. Information regarding [facility]’s financial assistance program can be found by going to [email or web address] or by calling [phone].

## 2025-02-14 NOTE — DISCHARGE NOTE PROVIDER - NSDCCPCAREPLAN_GEN_ALL_CORE_FT
PRINCIPAL DISCHARGE DIAGNOSIS  Diagnosis: Kidney stone  Assessment and Plan of Treatment: Keep well hydrated. Avoid heavy lifting or straining. You may notice blood tinged urine following the procedure, this is expected. If urine become bright red or passing blood clots, call the office. Call the office if you have fever greater than 101, difficulty urinating, pain not relieved with pain medication, nausea/vomiting or other worrisome symptoms arise.  You have a pouch over your flank dressing. Once output decreases, you may remove pouch and put guaze and tegaderm over the area.

## 2025-02-14 NOTE — PROGRESS NOTE ADULT - SUBJECTIVE AND OBJECTIVE BOX
Subjective: Pt seen and examined. No overnight events. Has been feeling well. No acute complaints    Objective    Vital signs  T(F): , Max: 98.5 (02-13-25 @ 19:01)  HR: 65 (02-14-25 @ 05:01)  BP: 133/77 (02-14-25 @ 05:01)  SpO2: 97% (02-14-25 @ 05:01)  Wt(kg): --    Output     OUT:    Drain (mL): 150 mL    Indwelling Catheter - Urethral (mL): 1525 mL  Total OUT: 1675 mL    Total NET: -1675 mL          Gen: NAD  Abd: soft, nt, nd. L flank dressing saturated with urine and changed.   : llanes with peach colored urine. removed intact for tov    Labs                          10.6   12.47 )-----------( 189      ( 14 Feb 2025 06:29 )             32.9   02-14    140  |  106  |  16  ----------------------------<  104[H]  4.3   |  24  |  1.27    Ca    9.3      14 Feb 2025 06:28          Urine Cx: Culture - Urine (02.10.25 @ 14:38)    Specimen Source: Kidney   Culture Results:   No growth at 48 hours    Culture - Urine (02.10.25 @ 14:33)    Specimen Source: Kidney   Culture Results:   No growth at 48 hours    Culture - Wound Aerobic (02.10.25 @ 14:42)    Specimen Source: .Other   Culture Results:   No growth        Imaging

## 2025-02-14 NOTE — PROGRESS NOTE ADULT - ASSESSMENT
A/P: 54 year old male s/p L PCNL 2nd stage 2/13 no stent and NT removed, L PCNL  2/10/25 POD #4  - AM labs   - Continue CTX until dcd  - no antibiotics on dc  - IV fluids  - OOB/DVT ppx  - TOV in AM  - Wean O2  - Dispo planning A/P: 54 year old male s/p L PCNL 2nd stage 2/13 no stent and NT removed, L PCNL  2/10/25 POD #4  - AM labs   - Continue CTX until dcd  - no antibiotics on dc  - IV fluids  - OOB/DVT ppx  - TOV in AM  - Dispo planning

## 2025-02-14 NOTE — DISCHARGE NOTE NURSING/CASE MANAGEMENT/SOCIAL WORK - PATIENT PORTAL LINK FT
You can access the FollowMyHealth Patient Portal offered by [facility] by registering at the following website: [email or web address]. By joining [facility]’s FollowMyHealth portal, you will also be able to view your health information using other applications (apps) compatible with our system.

## 2025-02-14 NOTE — DISCHARGE NOTE NURSING/CASE MANAGEMENT/SOCIAL WORK - NSDCPEFALRISK_GEN_ALL_CORE
For information on Fall & Injury Prevention, visit: [email or web address] OR  [email or web address] OR  https://www.cdc.gov/steadi/patient.html

## 2025-02-14 NOTE — DISCHARGE NOTE PROVIDER - NSDCCPTREATMENT_GEN_ALL_CORE_FT
PRINCIPAL PROCEDURE  Procedure: Nephrolithotomy, second look, percutaneous, using laser  Findings and Treatment:

## 2025-02-18 LAB
CELL MATERIAL STONE EST-MCNT: SIGNIFICANT CHANGE UP
LABORATORY COMMENT REPORT: SIGNIFICANT CHANGE UP
NIDUS STONE QN: SIGNIFICANT CHANGE UP

## 2025-02-19 LAB — SURGICAL PATHOLOGY STUDY: SIGNIFICANT CHANGE UP

## 2025-02-25 PROBLEM — H26.9 UNSPECIFIED CATARACT: Chronic | Status: ACTIVE | Noted: 2025-01-30

## 2025-03-06 ENCOUNTER — APPOINTMENT (OUTPATIENT)
Dept: UROLOGY | Facility: CLINIC | Age: 55
End: 2025-03-06
Payer: MEDICAID

## 2025-03-06 DIAGNOSIS — N13.30 UNSPECIFIED HYDRONEPHROSIS: ICD-10-CM

## 2025-03-06 DIAGNOSIS — N20.0 CALCULUS OF KIDNEY: ICD-10-CM

## 2025-03-06 DIAGNOSIS — E21.0 PRIMARY HYPERPARATHYROIDISM: ICD-10-CM

## 2025-03-06 PROCEDURE — 99024 POSTOP FOLLOW-UP VISIT: CPT

## 2025-03-10 ENCOUNTER — APPOINTMENT (OUTPATIENT)
Dept: UROLOGY | Facility: CLINIC | Age: 55
End: 2025-03-10

## (undated) DEVICE — SOL IRR BAG H2O 3000ML

## (undated) DEVICE — POSITIONER FOAM EGG CRATE ULNAR 2PCS (PINK)

## (undated) DEVICE — DRAPE DRAINAGE BAG RELAX & GEMINI

## (undated) DEVICE — PREP BETADINE KIT

## (undated) DEVICE — BAG URINE W METER 2L

## (undated) DEVICE — DRAPE LINGEMAN TUR

## (undated) DEVICE — POSITIONER CUSHION INSERT PRONE VIEW LG

## (undated) DEVICE — ADAPTER CHECK FLO 9FR STERILE

## (undated) DEVICE — TUBING THERMADX UROLOGY

## (undated) DEVICE — VENODYNE/SCD SLEEVE CALF MEDIUM

## (undated) DEVICE — NDL BIOPSY TROCAR TWO-PART 18G X 20CM

## (undated) DEVICE — SOL IRR POUR NS 0.9% 500ML

## (undated) DEVICE — DRAPE U (CLEAR) 47 X 51" NON STERILE

## (undated) DEVICE — DRSG TEGADERM 6 X 8"

## (undated) DEVICE — ACMI SELF-SEALING SEAL UP TO 7FR

## (undated) DEVICE — Device

## (undated) DEVICE — NDL 20CM TROCAR

## (undated) DEVICE — SOL IRR POUR H2O 1500ML

## (undated) DEVICE — GLV 8 PROTEXIS (CREAM) NEU-THERA

## (undated) DEVICE — DRAPE NEPHROSCOPY 72X118"

## (undated) DEVICE — PACK CYSTO

## (undated) DEVICE — SPECIMEN CONTAINER 100ML

## (undated) DEVICE — TUBING CONNECTING FOR DRAINAGE BAG MALE / FEMALE 14FR 30CM

## (undated) DEVICE — SYR LUER LOK 10CC

## (undated) DEVICE — WARMING BLANKET UPPER ADULT

## (undated) DEVICE — DRAPE TOWEL BLUE 17" X 24"

## (undated) DEVICE — AMPLATZ RENAL DILATOR 6FR-30FR X 20CM

## (undated) DEVICE — GOWN TRIMAX LG

## (undated) DEVICE — FOLEY HOLDER STATLOCK 2 WAY ADULT

## (undated) DEVICE — SOL IRR BAG NS 0.9% 3000ML

## (undated) DEVICE — POSITIONER FOAM HEADREST (PINK)

## (undated) DEVICE — TUBING SUCTION 20FT

## (undated) DEVICE — GLV 7 PROTEXIS (WHITE)

## (undated) DEVICE — IRR BULB PATHFINDER + 10"

## (undated) DEVICE — FOLEY CATH 2-WAY 22FR 5CC LATEX COUNCIL RED

## (undated) DEVICE — SUT POLYSORB 2-0 18" V-20

## (undated) DEVICE — DRAPE EQUIPMENT BANDED BAG 30 X 30" (SHOWER CAP)